# Patient Record
Sex: MALE | Race: WHITE | Employment: UNEMPLOYED | URBAN - METROPOLITAN AREA
[De-identification: names, ages, dates, MRNs, and addresses within clinical notes are randomized per-mention and may not be internally consistent; named-entity substitution may affect disease eponyms.]

---

## 2022-04-20 ENCOUNTER — TELEPHONE (OUTPATIENT)
Dept: FAMILY MEDICINE CLINIC | Facility: CLINIC | Age: 42
End: 2022-04-20

## 2022-06-30 ENCOUNTER — OFFICE VISIT (OUTPATIENT)
Dept: INTERNAL MEDICINE CLINIC | Facility: CLINIC | Age: 42
End: 2022-06-30
Payer: MEDICAID

## 2022-06-30 VITALS
DIASTOLIC BLOOD PRESSURE: 72 MMHG | WEIGHT: 170.38 LBS | TEMPERATURE: 98 F | HEART RATE: 76 BPM | BODY MASS INDEX: 24.95 KG/M2 | OXYGEN SATURATION: 97 % | HEIGHT: 69.29 IN | SYSTOLIC BLOOD PRESSURE: 116 MMHG

## 2022-06-30 DIAGNOSIS — Z00.00 WELLNESS EXAMINATION: Primary | ICD-10-CM

## 2022-06-30 DIAGNOSIS — G89.29 CHRONIC BILATERAL LOW BACK PAIN WITH BILATERAL SCIATICA: ICD-10-CM

## 2022-06-30 DIAGNOSIS — H91.93 BILATERAL HEARING LOSS, UNSPECIFIED HEARING LOSS TYPE: ICD-10-CM

## 2022-06-30 DIAGNOSIS — M54.41 CHRONIC BILATERAL LOW BACK PAIN WITH BILATERAL SCIATICA: ICD-10-CM

## 2022-06-30 DIAGNOSIS — M54.42 CHRONIC BILATERAL LOW BACK PAIN WITH BILATERAL SCIATICA: ICD-10-CM

## 2022-06-30 PROCEDURE — 3074F SYST BP LT 130 MM HG: CPT | Performed by: FAMILY MEDICINE

## 2022-06-30 PROCEDURE — 3078F DIAST BP <80 MM HG: CPT | Performed by: FAMILY MEDICINE

## 2022-06-30 PROCEDURE — 99202 OFFICE O/P NEW SF 15 MIN: CPT | Performed by: FAMILY MEDICINE

## 2022-06-30 PROCEDURE — 99386 PREV VISIT NEW AGE 40-64: CPT | Performed by: FAMILY MEDICINE

## 2022-06-30 PROCEDURE — 3008F BODY MASS INDEX DOCD: CPT | Performed by: FAMILY MEDICINE

## 2022-06-30 RX ORDER — NAPROXEN 500 MG/1
500 TABLET ORAL 2 TIMES DAILY PRN
Qty: 60 TABLET | Refills: 1 | Status: SHIPPED | OUTPATIENT
Start: 2022-06-30

## 2022-06-30 RX ORDER — CYCLOBENZAPRINE HCL 5 MG
5 TABLET ORAL NIGHTLY PRN
Qty: 14 TABLET | Refills: 0 | Status: SHIPPED | OUTPATIENT
Start: 2022-06-30

## 2022-07-01 ENCOUNTER — LAB ENCOUNTER (OUTPATIENT)
Dept: LAB | Facility: HOSPITAL | Age: 42
End: 2022-07-01
Attending: FAMILY MEDICINE
Payer: MEDICAID

## 2022-07-01 DIAGNOSIS — Z00.00 WELLNESS EXAMINATION: ICD-10-CM

## 2022-07-01 LAB
ALBUMIN SERPL-MCNC: 3.9 G/DL (ref 3.4–5)
ALBUMIN/GLOB SERPL: 1.2 {RATIO} (ref 1–2)
ALP LIVER SERPL-CCNC: 53 U/L
ALT SERPL-CCNC: 24 U/L
ANION GAP SERPL CALC-SCNC: 6 MMOL/L (ref 0–18)
AST SERPL-CCNC: 21 U/L (ref 15–37)
BASOPHILS # BLD AUTO: 0.04 X10(3) UL (ref 0–0.2)
BASOPHILS NFR BLD AUTO: 0.5 %
BILIRUB SERPL-MCNC: 0.7 MG/DL (ref 0.1–2)
BILIRUB UR QL: NEGATIVE
BUN BLD-MCNC: 11 MG/DL (ref 7–18)
BUN/CREAT SERPL: 9.6 (ref 10–20)
CALCIUM BLD-MCNC: 9 MG/DL (ref 8.5–10.1)
CHLORIDE SERPL-SCNC: 107 MMOL/L (ref 98–112)
CHOLEST SERPL-MCNC: 210 MG/DL (ref ?–200)
CLARITY UR: CLEAR
CO2 SERPL-SCNC: 28 MMOL/L (ref 21–32)
COLOR UR: YELLOW
CREAT BLD-MCNC: 1.14 MG/DL
DEPRECATED RDW RBC AUTO: 44.1 FL (ref 35.1–46.3)
EOSINOPHIL # BLD AUTO: 0.18 X10(3) UL (ref 0–0.7)
EOSINOPHIL NFR BLD AUTO: 2.1 %
ERYTHROCYTE [DISTWIDTH] IN BLOOD BY AUTOMATED COUNT: 13 % (ref 11–15)
EST. AVERAGE GLUCOSE BLD GHB EST-MCNC: 108 MG/DL (ref 68–126)
FASTING PATIENT LIPID ANSWER: YES
FASTING STATUS PATIENT QL REPORTED: YES
GLOBULIN PLAS-MCNC: 3.3 G/DL (ref 2.8–4.4)
GLUCOSE BLD-MCNC: 87 MG/DL (ref 70–99)
GLUCOSE UR-MCNC: NEGATIVE MG/DL
HBA1C MFR BLD: 5.4 % (ref ?–5.7)
HCT VFR BLD AUTO: 42.2 %
HDLC SERPL-MCNC: 54 MG/DL (ref 40–59)
HGB BLD-MCNC: 13.8 G/DL
HGB UR QL STRIP.AUTO: NEGATIVE
IMM GRANULOCYTES # BLD AUTO: 0.01 X10(3) UL (ref 0–1)
IMM GRANULOCYTES NFR BLD: 0.1 %
KETONES UR-MCNC: NEGATIVE MG/DL
LDLC SERPL CALC-MCNC: 138 MG/DL (ref ?–100)
LEUKOCYTE ESTERASE UR QL STRIP.AUTO: NEGATIVE
LYMPHOCYTES # BLD AUTO: 2.6 X10(3) UL (ref 1–4)
LYMPHOCYTES NFR BLD AUTO: 30.7 %
MCH RBC QN AUTO: 30.2 PG (ref 26–34)
MCHC RBC AUTO-ENTMCNC: 32.7 G/DL (ref 31–37)
MCV RBC AUTO: 92.3 FL
MONOCYTES # BLD AUTO: 0.56 X10(3) UL (ref 0.1–1)
MONOCYTES NFR BLD AUTO: 6.6 %
NEUTROPHILS # BLD AUTO: 5.07 X10 (3) UL (ref 1.5–7.7)
NEUTROPHILS # BLD AUTO: 5.07 X10(3) UL (ref 1.5–7.7)
NEUTROPHILS NFR BLD AUTO: 60 %
NITRITE UR QL STRIP.AUTO: NEGATIVE
NONHDLC SERPL-MCNC: 156 MG/DL (ref ?–130)
OSMOLALITY SERPL CALC.SUM OF ELEC: 291 MOSM/KG (ref 275–295)
PH UR: 5 [PH] (ref 5–8)
PLATELET # BLD AUTO: 222 10(3)UL (ref 150–450)
POTASSIUM SERPL-SCNC: 3.9 MMOL/L (ref 3.5–5.1)
PROT SERPL-MCNC: 7.2 G/DL (ref 6.4–8.2)
PROT UR-MCNC: NEGATIVE MG/DL
RBC # BLD AUTO: 4.57 X10(6)UL
SODIUM SERPL-SCNC: 141 MMOL/L (ref 136–145)
SP GR UR STRIP: 1.02 (ref 1–1.03)
TRIGL SERPL-MCNC: 103 MG/DL (ref 30–149)
UROBILINOGEN UR STRIP-ACNC: <2
VIT C UR-MCNC: NEGATIVE MG/DL
VLDLC SERPL CALC-MCNC: 19 MG/DL (ref 0–30)
WBC # BLD AUTO: 8.5 X10(3) UL (ref 4–11)

## 2022-07-01 PROCEDURE — 80053 COMPREHEN METABOLIC PANEL: CPT

## 2022-07-01 PROCEDURE — 85025 COMPLETE CBC W/AUTO DIFF WBC: CPT | Performed by: FAMILY MEDICINE

## 2022-07-01 PROCEDURE — 81003 URINALYSIS AUTO W/O SCOPE: CPT

## 2022-07-01 PROCEDURE — 80061 LIPID PANEL: CPT

## 2022-07-01 PROCEDURE — 36415 COLL VENOUS BLD VENIPUNCTURE: CPT

## 2022-07-01 PROCEDURE — 83036 HEMOGLOBIN GLYCOSYLATED A1C: CPT

## 2022-08-01 ENCOUNTER — TELEPHONE (OUTPATIENT)
Dept: INTERNAL MEDICINE CLINIC | Facility: CLINIC | Age: 42
End: 2022-08-01

## 2023-03-11 ENCOUNTER — HOSPITAL ENCOUNTER (OUTPATIENT)
Dept: GENERAL RADIOLOGY | Facility: HOSPITAL | Age: 43
Discharge: HOME | End: 2023-03-11
Attending: FAMILY MEDICINE
Payer: MEDICARE

## 2023-03-11 DIAGNOSIS — M54.41 CHRONIC BILATERAL LOW BACK PAIN WITH BILATERAL SCIATICA: ICD-10-CM

## 2023-03-11 DIAGNOSIS — G89.29 CHRONIC BILATERAL LOW BACK PAIN WITH BILATERAL SCIATICA: ICD-10-CM

## 2023-03-11 DIAGNOSIS — M54.42 CHRONIC BILATERAL LOW BACK PAIN WITH BILATERAL SCIATICA: ICD-10-CM

## 2023-03-11 PROCEDURE — 72100 X-RAY EXAM L-S SPINE 2/3 VWS: CPT | Performed by: FAMILY MEDICINE

## 2023-03-11 PROCEDURE — 72040 X-RAY EXAM NECK SPINE 2-3 VW: CPT | Performed by: FAMILY MEDICINE

## 2023-04-03 ENCOUNTER — OFFICE VISIT (OUTPATIENT)
Dept: INTERNAL MEDICINE CLINIC | Facility: CLINIC | Age: 43
End: 2023-04-03
Payer: MEDICARE

## 2023-04-03 ENCOUNTER — HOSPITAL ENCOUNTER (OUTPATIENT)
Dept: GENERAL RADIOLOGY | Facility: HOSPITAL | Age: 43
Discharge: HOME | End: 2023-04-03
Attending: FAMILY MEDICINE
Payer: MEDICARE

## 2023-04-03 VITALS
DIASTOLIC BLOOD PRESSURE: 80 MMHG | HEIGHT: 69.29 IN | SYSTOLIC BLOOD PRESSURE: 110 MMHG | WEIGHT: 178 LBS | OXYGEN SATURATION: 96 % | TEMPERATURE: 98 F | HEART RATE: 96 BPM | BODY MASS INDEX: 26.07 KG/M2

## 2023-04-03 DIAGNOSIS — G89.29 CHRONIC LEFT SHOULDER PAIN: Primary | ICD-10-CM

## 2023-04-03 DIAGNOSIS — M25.512 CHRONIC LEFT SHOULDER PAIN: ICD-10-CM

## 2023-04-03 DIAGNOSIS — M25.512 CHRONIC LEFT SHOULDER PAIN: Primary | ICD-10-CM

## 2023-04-03 DIAGNOSIS — M54.12 CERVICAL RADICULOPATHY: ICD-10-CM

## 2023-04-03 DIAGNOSIS — G89.29 CHRONIC LEFT SHOULDER PAIN: ICD-10-CM

## 2023-04-03 PROCEDURE — 99214 OFFICE O/P EST MOD 30 MIN: CPT | Performed by: FAMILY MEDICINE

## 2023-04-03 PROCEDURE — 73030 X-RAY EXAM OF SHOULDER: CPT | Performed by: FAMILY MEDICINE

## 2023-04-03 RX ORDER — IBUPROFEN 200 MG
200 TABLET ORAL EVERY 6 HOURS PRN
COMMUNITY

## 2023-04-03 RX ORDER — GABAPENTIN 100 MG/1
100 CAPSULE ORAL 2 TIMES DAILY PRN
Qty: 30 CAPSULE | Refills: 0 | Status: SHIPPED | OUTPATIENT
Start: 2023-04-03

## 2023-04-03 NOTE — PATIENT INSTRUCTIONS
If no improvement with physical therapy, contact our office so that further imaging could be ordered  If no improvement with gabapentin, contact our office for dose to be adjusted.

## 2023-07-25 ENCOUNTER — OFFICE VISIT (OUTPATIENT)
Dept: INTERNAL MEDICINE CLINIC | Facility: CLINIC | Age: 43
End: 2023-07-25
Payer: MEDICARE

## 2023-07-25 VITALS
HEIGHT: 69.29 IN | TEMPERATURE: 98 F | BODY MASS INDEX: 26.8 KG/M2 | OXYGEN SATURATION: 96 % | DIASTOLIC BLOOD PRESSURE: 70 MMHG | WEIGHT: 183 LBS | SYSTOLIC BLOOD PRESSURE: 118 MMHG | HEART RATE: 71 BPM

## 2023-07-25 DIAGNOSIS — M25.512 CHRONIC LEFT SHOULDER PAIN: ICD-10-CM

## 2023-07-25 DIAGNOSIS — Z00.00 WELLNESS EXAMINATION: Primary | ICD-10-CM

## 2023-07-25 DIAGNOSIS — G89.29 CHRONIC LEFT SHOULDER PAIN: ICD-10-CM

## 2023-07-25 DIAGNOSIS — L73.9 FOLLICULITIS: ICD-10-CM

## 2023-07-25 DIAGNOSIS — M54.12 CERVICAL RADICULOPATHY: ICD-10-CM

## 2023-07-25 DIAGNOSIS — L30.9 DERMATITIS: ICD-10-CM

## 2023-07-25 PROCEDURE — 99213 OFFICE O/P EST LOW 20 MIN: CPT | Performed by: FAMILY MEDICINE

## 2023-07-25 PROCEDURE — 99396 PREV VISIT EST AGE 40-64: CPT | Performed by: FAMILY MEDICINE

## 2023-07-25 RX ORDER — MUPIROCIN CALCIUM 20 MG/G
1 CREAM TOPICAL 2 TIMES DAILY PRN
Qty: 15 G | Refills: 0 | Status: SHIPPED | OUTPATIENT
Start: 2023-07-25 | End: 2023-08-08

## 2023-07-25 RX ORDER — TRIAMCINOLONE ACETONIDE 1 MG/G
CREAM TOPICAL 2 TIMES DAILY PRN
Qty: 15 G | Refills: 1 | Status: SHIPPED | OUTPATIENT
Start: 2023-07-25

## 2023-07-25 RX ORDER — GABAPENTIN 300 MG/1
300 CAPSULE ORAL 2 TIMES DAILY PRN
Qty: 60 CAPSULE | Refills: 2 | Status: SHIPPED | OUTPATIENT
Start: 2023-07-25

## 2023-07-29 ENCOUNTER — LAB ENCOUNTER (OUTPATIENT)
Dept: LAB | Facility: HOSPITAL | Age: 43
End: 2023-07-29
Attending: FAMILY MEDICINE
Payer: MEDICARE

## 2023-07-29 DIAGNOSIS — Z00.00 WELLNESS EXAMINATION: ICD-10-CM

## 2023-07-29 LAB
ALBUMIN SERPL-MCNC: 3.6 G/DL (ref 3.4–5)
ALBUMIN/GLOB SERPL: 1 {RATIO} (ref 1–2)
ALP LIVER SERPL-CCNC: 65 U/L
ALT SERPL-CCNC: 17 U/L
ANION GAP SERPL CALC-SCNC: 4 MMOL/L (ref 0–18)
AST SERPL-CCNC: 16 U/L (ref 15–37)
BASOPHILS # BLD AUTO: 0.02 X10(3) UL (ref 0–0.2)
BASOPHILS NFR BLD AUTO: 0.2 %
BILIRUB SERPL-MCNC: 0.4 MG/DL (ref 0.1–2)
BUN BLD-MCNC: 12 MG/DL (ref 7–18)
BUN/CREAT SERPL: 10.5 (ref 10–20)
CALCIUM BLD-MCNC: 8.9 MG/DL (ref 8.5–10.1)
CHLORIDE SERPL-SCNC: 107 MMOL/L (ref 98–112)
CHOLEST SERPL-MCNC: 235 MG/DL (ref ?–200)
CO2 SERPL-SCNC: 29 MMOL/L (ref 21–32)
CREAT BLD-MCNC: 1.14 MG/DL
DEPRECATED RDW RBC AUTO: 42.1 FL (ref 35.1–46.3)
EGFRCR SERPLBLD CKD-EPI 2021: 81 ML/MIN/1.73M2 (ref 60–?)
EOSINOPHIL # BLD AUTO: 0.15 X10(3) UL (ref 0–0.7)
EOSINOPHIL NFR BLD AUTO: 1.6 %
ERYTHROCYTE [DISTWIDTH] IN BLOOD BY AUTOMATED COUNT: 12.9 % (ref 11–15)
EST. AVERAGE GLUCOSE BLD GHB EST-MCNC: 103 MG/DL (ref 68–126)
FASTING PATIENT LIPID ANSWER: YES
FASTING STATUS PATIENT QL REPORTED: YES
GLOBULIN PLAS-MCNC: 3.7 G/DL (ref 2.8–4.4)
GLUCOSE BLD-MCNC: 94 MG/DL (ref 70–99)
HBA1C MFR BLD: 5.2 % (ref ?–5.7)
HCT VFR BLD AUTO: 44.9 %
HDLC SERPL-MCNC: 46 MG/DL (ref 40–59)
HGB BLD-MCNC: 14.8 G/DL
IMM GRANULOCYTES # BLD AUTO: 0.03 X10(3) UL (ref 0–1)
IMM GRANULOCYTES NFR BLD: 0.3 %
LDLC SERPL CALC-MCNC: 168 MG/DL (ref ?–100)
LYMPHOCYTES # BLD AUTO: 2.31 X10(3) UL (ref 1–4)
LYMPHOCYTES NFR BLD AUTO: 25.3 %
MCH RBC QN AUTO: 29.5 PG (ref 26–34)
MCHC RBC AUTO-ENTMCNC: 33 G/DL (ref 31–37)
MCV RBC AUTO: 89.4 FL
MONOCYTES # BLD AUTO: 0.6 X10(3) UL (ref 0.1–1)
MONOCYTES NFR BLD AUTO: 6.6 %
NEUTROPHILS # BLD AUTO: 6.02 X10 (3) UL (ref 1.5–7.7)
NEUTROPHILS # BLD AUTO: 6.02 X10(3) UL (ref 1.5–7.7)
NEUTROPHILS NFR BLD AUTO: 66 %
NONHDLC SERPL-MCNC: 189 MG/DL (ref ?–130)
OSMOLALITY SERPL CALC.SUM OF ELEC: 290 MOSM/KG (ref 275–295)
PLATELET # BLD AUTO: 257 10(3)UL (ref 150–450)
POTASSIUM SERPL-SCNC: 3.7 MMOL/L (ref 3.5–5.1)
PROT SERPL-MCNC: 7.3 G/DL (ref 6.4–8.2)
RBC # BLD AUTO: 5.02 X10(6)UL
SODIUM SERPL-SCNC: 140 MMOL/L (ref 136–145)
TRIGL SERPL-MCNC: 114 MG/DL (ref 30–149)
TSI SER-ACNC: 0.77 MIU/ML (ref 0.36–3.74)
VLDLC SERPL CALC-MCNC: 23 MG/DL (ref 0–30)
WBC # BLD AUTO: 9.1 X10(3) UL (ref 4–11)

## 2023-07-29 PROCEDURE — 36415 COLL VENOUS BLD VENIPUNCTURE: CPT

## 2023-07-29 PROCEDURE — 85025 COMPLETE CBC W/AUTO DIFF WBC: CPT | Performed by: FAMILY MEDICINE

## 2023-07-29 PROCEDURE — 80061 LIPID PANEL: CPT

## 2023-07-29 PROCEDURE — 80053 COMPREHEN METABOLIC PANEL: CPT

## 2023-07-29 PROCEDURE — 84443 ASSAY THYROID STIM HORMONE: CPT

## 2023-07-29 PROCEDURE — 83036 HEMOGLOBIN GLYCOSYLATED A1C: CPT

## 2023-08-06 ENCOUNTER — HOSPITAL ENCOUNTER (OUTPATIENT)
Dept: CT IMAGING | Facility: HOSPITAL | Age: 43
End: 2023-08-06
Attending: FAMILY MEDICINE
Payer: MEDICARE

## 2023-08-06 ENCOUNTER — HOSPITAL ENCOUNTER (OUTPATIENT)
Dept: CT IMAGING | Facility: HOSPITAL | Age: 43
Discharge: HOME | End: 2023-08-06
Attending: FAMILY MEDICINE
Payer: MEDICARE

## 2023-08-06 DIAGNOSIS — M54.12 CERVICAL RADICULOPATHY: ICD-10-CM

## 2023-08-06 PROCEDURE — 72125 CT NECK SPINE W/O DYE: CPT | Performed by: FAMILY MEDICINE

## 2023-08-09 ENCOUNTER — PATIENT MESSAGE (OUTPATIENT)
Dept: INTERNAL MEDICINE CLINIC | Facility: CLINIC | Age: 43
End: 2023-08-09

## 2023-08-10 NOTE — TELEPHONE ENCOUNTER
From: Ethan Luu  Sent: 8/10/2023 10:23 AM CDT  To: [facility] Clinical Staff  Subject: Question regarding LIPID PANEL    Cant see it too small and cant zoom it

## 2023-08-13 ENCOUNTER — PATIENT MESSAGE (OUTPATIENT)
Dept: INTERNAL MEDICINE CLINIC | Facility: CLINIC | Age: 43
End: 2023-08-13

## 2023-09-01 ENCOUNTER — HOSPITAL ENCOUNTER (OUTPATIENT)
Dept: MRI IMAGING | Facility: HOSPITAL | Age: 43
Discharge: HOME | End: 2023-09-01
Attending: FAMILY MEDICINE
Payer: MEDICARE

## 2023-09-01 ENCOUNTER — PATIENT MESSAGE (OUTPATIENT)
Dept: INTERNAL MEDICINE CLINIC | Facility: CLINIC | Age: 43
End: 2023-09-01

## 2023-09-01 DIAGNOSIS — M25.512 CHRONIC LEFT SHOULDER PAIN: ICD-10-CM

## 2023-09-01 DIAGNOSIS — G89.29 CHRONIC LEFT SHOULDER PAIN: ICD-10-CM

## 2023-09-01 PROCEDURE — 73221 MRI JOINT UPR EXTREM W/O DYE: CPT | Performed by: FAMILY MEDICINE

## 2023-09-06 ENCOUNTER — PATIENT MESSAGE (OUTPATIENT)
Dept: INTERNAL MEDICINE CLINIC | Facility: CLINIC | Age: 43
End: 2023-09-06

## 2023-09-07 NOTE — TELEPHONE ENCOUNTER
From: Rosas Maya  To: New Connor MD  Sent: 9/6/2023 12:48 PM CDT  Subject: Question regarding MRI SHOULDER, LEFT (WITHOUT CONTRAST) (CPT=73221)    Okay I have to call them to make appt ? ?

## 2023-10-31 ENCOUNTER — OFFICE VISIT (OUTPATIENT)
Dept: ORTHOPEDICS CLINIC | Facility: CLINIC | Age: 43
End: 2023-10-31

## 2023-10-31 VITALS — HEART RATE: 78 BPM | SYSTOLIC BLOOD PRESSURE: 115 MMHG | DIASTOLIC BLOOD PRESSURE: 77 MMHG

## 2023-10-31 DIAGNOSIS — M54.12 LEFT CERVICAL RADICULOPATHY: ICD-10-CM

## 2023-10-31 DIAGNOSIS — M75.22 BICEPS TENDINITIS OF LEFT SHOULDER: ICD-10-CM

## 2023-10-31 DIAGNOSIS — M50.30 DEGENERATIVE DISC DISEASE, CERVICAL: ICD-10-CM

## 2023-10-31 DIAGNOSIS — M75.82 ROTATOR CUFF TENDINITIS, LEFT: Primary | ICD-10-CM

## 2023-10-31 PROCEDURE — 20610 DRAIN/INJ JOINT/BURSA W/O US: CPT | Performed by: ORTHOPAEDIC SURGERY

## 2023-10-31 PROCEDURE — 99204 OFFICE O/P NEW MOD 45 MIN: CPT | Performed by: ORTHOPAEDIC SURGERY

## 2023-10-31 RX ORDER — TRIAMCINOLONE ACETONIDE 40 MG/ML
40 INJECTION, SUSPENSION INTRA-ARTICULAR; INTRAMUSCULAR ONCE
Status: COMPLETED | OUTPATIENT
Start: 2023-10-31 | End: 2023-10-31

## 2023-10-31 RX ADMIN — TRIAMCINOLONE ACETONIDE 40 MG: 40 INJECTION, SUSPENSION INTRA-ARTICULAR; INTRAMUSCULAR at 11:47:00

## 2023-10-31 NOTE — PROGRESS NOTES
Per verbal order from Dr. Iqra River, draw up 5ml of 0.5% Marcaine and 1ml of Kenalog 40 for cortisone injection to left shoulder. Carmine Mitchell MA  Patient provided education handout for cortisone injection.

## 2023-10-31 NOTE — H&P
NURSING INTAKE COMMENTS: Patient presents with:  Shoulder Pain: Consult- use  Jesusita WHITE#[id number]- L shoulder -onset- a yr ago- woking as  he think it's fr repetitive movement- rates pain 9-10/10 most of the time- has xray and MRI in the system  Neck Pain: Consult- also a yr ago- stated pain radiates down to his arm- has tingling in the hand- has CT and xray in the system      HPI: This 39year old right-hand-dominant male presents today with his wife. Language line provided sign language interpretation. For about a year he has had cervical complaints radiating down to the left C6 dermatome with occasional tingling. His left shoulder has intrinsic pains as well and the seem to be his more concerning problem. He denies bowel or bladder problems or balance disorder. He says sometimes he feels he has little weakness of left . He continues to work as a  sometimes for 4-hour shifts and sometimes for 8-hour shifts. He lives independently his wife and small child. He is in good shape physically. He denies diabetes. Past Medical History:   Diagnosis Date    Deaf      Past Surgical History:   Procedure Laterality Date    HERNIA SURGERY       Current Outpatient Medications   Medication Sig Dispense Refill    gabapentin 300 MG Oral Cap Take 1 capsule (300 mg total) by mouth 2 (two) times daily as needed (nerve pain). 60 capsule 2    triamcinolone 0.1 % External Cream Apply topically 2 (two) times daily as needed. For foot rash 15 g 1    ibuprofen 200 MG Oral Tab Take 1 tablet (200 mg total) by mouth every 6 (six) hours as needed.  (Patient not taking: Reported on 7/25/2023)       No Known Allergies  Family History   Problem Relation Age of Onset    Colon Cancer Father 80    Other (osteoarthritis) Mother     Other (digestive issues) Mother         unclear- had surgery     No family Hx of DVT/PE    Social History    Occupational History      Not on file    Tobacco Use      Smoking status: Never      Smokeless tobacco: Never    Substance and Sexual Activity      Alcohol use: Not on file      Drug use: Not on file      Sexual activity: Not on file       Review of Systems:  GENERAL: feels generally well, no significant weight loss or weight gain  SKIN: no ulcerated or worrisome skin lesions  EYES:denies blurred vision or double vision  HEENT: denies new nasal congestion, sinus pain or ST  LUNGS: denies shortness of breath  CARDIOVASCULAR: denies chest pain  GI: no hematemesis, no worsening heartburn, no diarrhea  : no dysuria, no blood in urine, no difficulty urinating, no incontinence  MUSCULOSKELETAL: no other musculoskeletal complaints other than in HPI  NEURO: no numbness or tingling, no weakness or balance disorder  PSYCHE: no depression or anxiety  HEMATOLOGIC: no hx of blood dyscrasia, no Hx DVT/PE  ENDOCRINE: no thyroid or diabetes issues  ALL/ASTHMA: no new hx of severe allergy or asthma    Physical Examination:    There were no vitals taken for this visit. Constitutional: appears well hydrated, alert and responsive, no acute distress noted  Extremities: The contours of the left shoulder were symmetrically normal without deltoid atrophy, scapular winging, or biceps defect. No masses on his neck. Musculoskeletal: Full cervical range of motion with negative Spurling test.  Full shoulder motion bilaterally in all directions. No acromioclavicular tenderness. Normal rotator cuff strength for all 4 tendons. Positive biceps and impingement sign left shoulder. Neurological: Symmetrically diminished reflexes upper and lower extremities. Able to toe walk and heel walk easily. Gait normal.  Normal motor and sensory to the left upper extremity currently. Tinel's sign to the left carpal tunnel was negative. Imaging: X-rays and CT of the cervical spine show severe degenerative disc disease at C5-6 and C6-7.     MRI of the left shoulder shows minimal partial-thickness tears.  These are well less than 50%. It is more of a tendinitis and tendinopathy for the biceps and rotator cuff. The radiologist read a small nondisplaced SLAP tear. His symptoms correlate with the biceps and rotator cuff. No results found. Lab Results   Component Value Date    WBC 9.1 07/29/2023    HGB 14.8 07/29/2023    .0 07/29/2023      Lab Results   Component Value Date    GLU 94 07/29/2023    BUN 12 07/29/2023    CREATSERUM 1.14 07/29/2023    GFRNAA 79 07/01/2022    GFRAA 91 07/01/2022        Assessment and Plan:  Diagnoses and all orders for this visit:    Rotator cuff tendinitis, left  -     PHYSICAL THERAPY - INTERNAL  -     Arthrocentesis aspiration and injection major Left joint bursa w/o US  -     triamcinolone acetonide (Kenalog-40) 40 MG/ML injection 40 mg    Biceps tendinitis of left shoulder  -     PHYSICAL THERAPY - INTERNAL  -     Arthrocentesis aspiration and injection major Left joint bursa w/o US  -     triamcinolone acetonide (Kenalog-40) 40 MG/ML injection 40 mg    Degenerative disc disease, cervical  -     PHYSICAL THERAPY - INTERNAL  -     MRI SPINE CERVICAL (CPT=72141); Future    Left cervical radiculopathy  -     PHYSICAL THERAPY - INTERNAL  -     MRI SPINE CERVICAL (CPT=72141); Future        Assessment: Left shoulder biceps and rotator cuff tendinitis, degenerative disc disease C5-6 and C6-7 with left C6 radiculopathy intermittently. Neurologically intact. Symptoms for 1 year. No trauma and no work comp issues. Plan: I recommended cortisone injection of the left shoulder, physical therapy for both the neck and left shoulder, and MRI of the cervical spine. He agreed to all the above. He will do the home exercises. Aspiration of the left shoulder was negative and he tolerated the injection bupivacaine 0.5% 5 cc and Kenalog 1 cc without issue mostly in the glenohumeral joint and some to the subacromial space. He will do the home exercise a learned in therapy. I will see him after the MRI of the cervical spine for further recommendations. Pain center may be a recommendation at that time. Follow Up: No follow-ups on file.     Andreia Manley MD

## 2023-11-12 ENCOUNTER — HOSPITAL ENCOUNTER (OUTPATIENT)
Dept: MRI IMAGING | Facility: HOSPITAL | Age: 43
End: 2023-11-12
Attending: ORTHOPAEDIC SURGERY
Payer: MEDICARE

## 2023-11-12 ENCOUNTER — HOSPITAL ENCOUNTER (OUTPATIENT)
Dept: MRI IMAGING | Facility: HOSPITAL | Age: 43
Discharge: HOME | End: 2023-11-12
Attending: ORTHOPAEDIC SURGERY
Payer: MEDICARE

## 2023-11-12 DIAGNOSIS — M54.12 LEFT CERVICAL RADICULOPATHY: ICD-10-CM

## 2023-11-12 DIAGNOSIS — M50.30 DEGENERATIVE DISC DISEASE, CERVICAL: ICD-10-CM

## 2023-11-12 PROCEDURE — 72141 MRI NECK SPINE W/O DYE: CPT | Performed by: ORTHOPAEDIC SURGERY

## 2023-11-21 ENCOUNTER — OFFICE VISIT (OUTPATIENT)
Dept: ORTHOPEDICS CLINIC | Facility: CLINIC | Age: 43
End: 2023-11-21
Payer: MEDICARE

## 2023-11-21 DIAGNOSIS — G95.0 SYRINX OF SPINAL CORD (HCC): ICD-10-CM

## 2023-11-21 DIAGNOSIS — M50.30 DEGENERATIVE DISC DISEASE, CERVICAL: ICD-10-CM

## 2023-11-21 DIAGNOSIS — M54.12 LEFT CERVICAL RADICULOPATHY: ICD-10-CM

## 2023-11-21 DIAGNOSIS — M75.82 ROTATOR CUFF TENDINITIS, LEFT: Primary | ICD-10-CM

## 2023-11-21 DIAGNOSIS — M75.22 BICEPS TENDINITIS OF LEFT SHOULDER: ICD-10-CM

## 2023-11-21 PROCEDURE — 99214 OFFICE O/P EST MOD 30 MIN: CPT | Performed by: ORTHOPAEDIC SURGERY

## 2023-11-21 NOTE — PROGRESS NOTES
NURSING INTAKE COMMENTS:   Chief Complaint   Patient presents with    Follow - Up     Use american sign language Deana Fu ZF#[id number]- L shoulder and neck pain f/u- had inj on L shoulder w/ some improvement- rates pain 6-7/10 on and off- aslo here for cervical spine MRI result       HPI: This 39year old male presents today with sign language language line for interpretation. He was here for follow-up for the left shoulder and cervical spine but also for new problem of thoracic syrinx. Since her last visit we injected the left shoulder, he said the left shoulder pain was improved but not resolved and he really could not give a percentage. For the neck to the left arm he points to the left C6 dermatome similar to last visit. He says occasionally he might feel left hand weakness but today he had normal strength and denies bowel or bladder problems or balance disorder. He denies thoracic radiculopathy or dysesthesias. He denies bowel or bladder problems or balance disorder. His first physical therapy session is scheduled for 11/27/2023. Past Medical History:   Diagnosis Date    Deaf      Past Surgical History:   Procedure Laterality Date    HERNIA SURGERY       Current Outpatient Medications   Medication Sig Dispense Refill    gabapentin 300 MG Oral Cap Take 1 capsule (300 mg total) by mouth 2 (two) times daily as needed (nerve pain). 60 capsule 2    triamcinolone 0.1 % External Cream Apply topically 2 (two) times daily as needed. For foot rash 15 g 1    ibuprofen 200 MG Oral Tab Take 1 tablet (200 mg total) by mouth every 6 (six) hours as needed.  (Patient not taking: Reported on 7/25/2023)       No Known Allergies  Family History   Problem Relation Age of Onset    Colon Cancer Father 80    Other (osteoarthritis) Mother     Other (digestive issues) Mother         unclear- had surgery     No family Hx of DVT/PE    Social History     Occupational History    Not on file   Tobacco Use    Smoking status: Never Smokeless tobacco: Never   Substance and Sexual Activity    Alcohol use: Not on file    Drug use: Not on file    Sexual activity: Not on file        Review of Systems:  GENERAL: feels generally well, no significant weight loss or weight gain  SKIN: no ulcerated or worrisome skin lesions  EYES:denies blurred vision or double vision  HEENT: denies new nasal congestion, sinus pain or ST  LUNGS: denies shortness of breath  CARDIOVASCULAR: denies chest pain  GI: no hematemesis, no worsening heartburn, no diarrhea  : no dysuria, no blood in urine, no difficulty urinating, no incontinence  MUSCULOSKELETAL: no other musculoskeletal complaints other than in HPI  NEURO: no numbness or tingling, no weakness or balance disorder  PSYCHE: no depression or anxiety  HEMATOLOGIC: no hx of blood dyscrasia, no Hx DVT/PE  ENDOCRINE: no thyroid or diabetes issues  ALL/ASTHMA: no new hx of severe allergy or asthma    Physical Examination:    There were no vitals taken for this visit. Constitutional: appears well hydrated, alert and responsive, no acute distress noted  Extremities: Neck had near full range of motion similar last visit. Spurling test negative. 5 out of 5 strength in both upper extremities with negative Moore test.  Symmetrically diminished reflexes. No clonus and normal gait and no thoracic radiculopathy complaints. Musculoskeletal: Full motion left shoulder with tenderness mostly in the bicep. No acromioclavicular tenderness and negative crossover. Normal rotator cuff strength. Neurological: As above. No numbness or tingling. Imaging: MRI cervical spine showed the worst foraminal stenosis at left C3-4 however symptoms are left C6. C5-6 does have foraminal stenosis bilaterally including the left side. This is probably a symptomatic level. Syrinx is seen at the bottom of the films in the upper thoracic spine.       MRI SPINE CERVICAL (CPT=72141)    Result Date: 11/13/2023  PROCEDURE: MRI SPINE CERVICAL (IQC=37978)  COMPARISON: [facility], MRI SHOULDER, LEFT (CPT=73221), 9/01/2023, 3:14 PM.  [facility], CT SPINE CERVICAL (FOK=70299), 8/06/2023, 7:30 AM.  [facility] for [facility], XR CERVICAL SPINE (2-3 VIEWS) (CPT=72040), 3/11/2023, 8:12 AM.  INDICATIONS: M54.12 Left cervical radiculopathy M50.30 Degenerative disc disease, cervical  TECHNIQUE: A variety of imaging planes and parameters were utilized for visualization of suspected pathology. Counting reference:  Craniocervical junction. FINDINGS:  CRANIOCERVICAL AREA: Normal foramen magnum with no Chiari malformation. PARASPINAL AREA: Normal with no visible mass. BONES: There are hypertrophic changes of the uncovertebral and facet joints bilaterally. Small anterior endplate osteophytes seen within the cervical vertebral bodies. There is no acute fracture, dislocation or marrow replacing lesion. Marrow edema seen  within the C6-C7 vertebral bodies and pedicles. CORD: 2 cm T2 and STIR hyperintense lesion seen within the thoracic cord at T2. OTHER: Grade 1 anterolisthesis of C3 on C4 and C4 on C5. Mild retrolisthesis of C5 on C6. Marcia Ceron CERVICAL DISC LEVELS: C2-C3: Mild hypertrophic changes of the facet joints and uncinate joints without canal or foraminal narrowing. C3-C4: Disk osteophyte complex which is asymmetric to the left along with facet and uncinate joint hypertrophy result in moderate narrowing of the canal and severe narrowing of the left neural foramen. There is mild narrowing of the right neural foramen. There is near complete effacement of the left ventral thecal space with left ventral cord abutment and flattening. There is mild narrowing of the right dorsal thecal space. There is bony encroachment upon the exiting left C4 nerve root. C4-C5: Disk osteophyte complex, facet, and uncinate joint hypertrophy result in mild narrowing of the canal and bilateral neural foramen.  C5-C6: Disk osteophyte complex, facet, and uncinate joint hypertrophy result in moderate narrowing of the canal and bilateral neural foramen. There is near complete effacement of the ventral thecal space with ventral cord abutment. The dorsal thecal space remains patent. C6-C7: Disk osteophyte complex, facet, and uncinate joint hypertrophy result in mild narrowing of the canal and bilateral neural foramen. C7-T1: Mild hypertrophic changes of the facet joints and uncinate joints without canal or foraminal narrowing. Perineural cysts seen within the bilateral neural foramen. CONCLUSION:   2 cm STIR hyperintense lesion within the thoracic cord at T2 suggestive of a syrinx. A dedicated MRI of the thoracic spine with IV contrast if patient can tolerate IV contrast is recommended for further evaluation. Degenerative disc, facet, uncovertebral joint disease throughout the cervical spine, most prominent at C3-C4 where there is severe narrowing of the left neural foramen. There is bony encroachment upon the exiting left C3 nerve root. No high-grade canal narrowing. Multilevel degenerative spondylolisthesis as described. Marrow edema within the C6-C7 vertebral bodies and pedicles suggestive of stress changes.      Dictated by (CST): Higinio Perez MD on 11/13/2023 at 10:47 AM     Finalized by (CST): Higinio Perez MD on 11/13/2023 at 11:02 AM             Lab Results   Component Value Date    WBC 9.1 07/29/2023    HGB 14.8 07/29/2023    .0 07/29/2023      Lab Results   Component Value Date    GLU 94 07/29/2023    BUN 12 07/29/2023    CREATSERUM 1.14 07/29/2023    GFRNAA 79 07/01/2022    GFRAA 91 07/01/2022        Assessment and Plan:  Diagnoses and all orders for this visit:    Rotator cuff tendinitis, left    Biceps tendinitis of left shoulder    Degenerative disc disease, cervical    Left cervical radiculopathy  -     PHYSICAL THERAPY - INTERNAL  -     Pain Management Referral - [facility] Location    Syrinx of spinal cord ([facility] [address]  -     MRI SPINE THORACIC (W+WO) (CPT=72157); Future  -     Neurosurgery Referral - In Network        Assessment: Above diagnoses. Plan: For the cervical spine recommended pain center consult and physical therapy. I do not think any surgery at the present time. For the left shoulder he needs to start his physical therapy. He will do the home exercise program.  If he is not improving in 6 to 8 weeks, he may consider making an appointment to discuss arthroscopy of the left shoulder for repairs versus debridements. For the syrinx, he does not seem to have symptoms but due to the size we will get a thoracic MRI with and without contrast and a neurosurgery referral.  I told him it is likely that it will be observed but we should get an opinion about this. For now I will see him as needed unless his problems worsen and he wishes to discuss surgery for the shoulder. Follow Up: No follow-ups on file.     Lalito Swartz MD

## 2023-11-22 ENCOUNTER — TELEPHONE (OUTPATIENT)
Dept: PAIN CLINIC | Facility: HOSPITAL | Age: 43
End: 2023-11-22

## 2023-11-27 ENCOUNTER — OFFICE VISIT (OUTPATIENT)
Dept: PHYSICAL THERAPY | Facility: HOSPITAL | Age: 43
End: 2023-11-27
Attending: ORTHOPAEDIC SURGERY
Payer: MEDICARE

## 2023-11-27 DIAGNOSIS — M54.12 LEFT CERVICAL RADICULOPATHY: ICD-10-CM

## 2023-11-27 DIAGNOSIS — M50.30 DEGENERATIVE DISC DISEASE, CERVICAL: ICD-10-CM

## 2023-11-27 DIAGNOSIS — M75.22 BICEPS TENDINITIS OF LEFT SHOULDER: ICD-10-CM

## 2023-11-27 DIAGNOSIS — M75.82 ROTATOR CUFF TENDINITIS, LEFT: Primary | ICD-10-CM

## 2023-11-27 PROCEDURE — 97110 THERAPEUTIC EXERCISES: CPT

## 2023-11-27 PROCEDURE — 97162 PT EVAL MOD COMPLEX 30 MIN: CPT

## 2023-11-29 ENCOUNTER — OFFICE VISIT (OUTPATIENT)
Dept: PHYSICAL THERAPY | Facility: HOSPITAL | Age: 43
End: 2023-11-29
Attending: ORTHOPAEDIC SURGERY
Payer: MEDICARE

## 2023-11-29 PROCEDURE — 97140 MANUAL THERAPY 1/> REGIONS: CPT

## 2023-11-29 PROCEDURE — 97110 THERAPEUTIC EXERCISES: CPT

## 2023-11-29 NOTE — PROGRESS NOTES
Referring Physician: Bhupendra Dumont MD     Date of Onset: 1  Date of eval: 11/27/23   Diagnosis: Rotator cuff tendinitis, left (M75.82)  Biceps tendinitis of left shoulder (M75.22)  Degenerative disc disease, cervical (M50.30)  Left cervical radiculopathy (M54.12)  Precautions: presence of Syrinx on thoracic MRI, decreased DTR's, at bilateral UE reflex testing. [facility] [phone] language line american sign language  Subjective: patient reports it has been hard to not irritate shoulder and neck due to continued part time janitorial work 20hrs/wk which involved some lifting and repetitive movements. Patient also reports pain attempting to lift 2 yo dtr. Pain: 8/10 neck   5/10 left shoulder area      Objective: Work setting requirements Mon-Fri: working 4 hours 5 days   20 hr/wk,   cleaning tables, wiping tables, empty trash bins.   Max wt possibly           lifting dtr, 2 yo   Initial eval: Observation/Posture: bilateral winging of scapula, mild fwd head  posture and mild depressed sternum, left anterior humeral head glide and medial rotation        initial initial   Cervical AROM: deg Pain (+/-)   Flexion 40        5/10   Extension 42    3/10   R Sidebend 25 8/10   L Sidebend 30 6/10   R Rotation 52 4/10   L Rotation 55 4/10   Protrusion WNL 5/10   Retraction Slight dec 5/10            Shoulder AROM: deg      initial initial 11/29/23     R    L L   Flex 140 130  8/10 130 5/10   Abd 165 150  8/10    ER WNL 60  8/10    IR WNL WNL          Strength UE:   -/5 MMT    initial initial     R  L   Shoulder flex      5 4 pn   Shoulder ext 5 5   Abduction (C5) 5 4pn   ER 5 5-   IR 5 5-   Biceps (C6) 5 5-   Wrist ext (C6) 5 5   Triceps (C7) 5 5-pn   Wrist flex (C7) 5 5   Digit ext (C7) 5 5   Digit flex (C8) 5 5   EPL (C8)  5 5   Interossei (T1) NT NT                  Flexibility: WNL, min, mod, severe limitation   initial initial     R L   Upper trap minimally restricted moderately restricted   Levator scap minimally restricted moderately restricted   Scalenes minimally restricted moderately restricted   Pec Major moderately restricted moderately restricted   Pec Minor moderately restricted moderately restricted   Lats moderately restricted severely restricted      Palpation: tenderness at upper traps left and left medial aspect scapula, anterior deltoid left and left biceps tendons  Upper Limb Tension Tests: TBD  Neuro Screen: sensation intact currently BUE  Decreased bilateral biceps, triceps and brachioradialis DTR's  Accessory Motion: decreased posterior glide left humeral head  Special Tests: ziegler rubia left +   speeds + left, Spurling's reported to reproduce pain. Cervical distraction decreased symptoms       Patient was instructed in and issued HEP for: see below for cervical and scapular retractions and posture training           Assessment: Patient with improved posture awareness, requires some cues to avoid excessive effort with cervical retraction that results in cervical compression rather than desired lengthening of spine. Goals: achieved at 8-12 sessions     1. Pt will be independent in beginning level of HEP for stretching, posture and strengthening. 2. Pt will demonstrate good scapulohumeral  mechanics awareness for prevention of reinjury and improved scapular upward rotation during AROM attempts with LUE. 3. Pt will be able to perform shoulder left  flexion to 140 deg and abd to 140 deg without significant increased symptoms and without significant scapular substitution. 4. Pt will be able to perform basic dressing/cooking/ light housework  without significantly increased symptoms. 5. Pt will be able to perform sleep postures without significant pain and  without sleep interruption. 6. Pt will demonstrate a significant reduction in pain at left  shoulder to allow for more tolerable ADLs with use of LUE  7.  Patient will demonstrate centralization of left UE p/n and radiating symptoms and reduction in max pain by 50-75% or more  Plan: Continue to progress exercises and improved humeral head centralization and progressive strengthening of shoulder cuff. Date: 11/29/2023  TX#: 2/8-12 Date:                 TX#: 3/ Date:                 TX#: 4/ Date:                 TX#: 5/ Date: Tx#: 6/   Therap ex:  30 mins       Cervical retractions: 5 x 5 secs  -scapular retractions 5 x 3 secs  -finger ladder R/L 42/36  Diaphragmatic breathing ex seated  -neck lateral flexion 3 x 15 secs bilateral  -OH pulley seated to tolerance. -supine passive stretch pect minor bilateral for one minute. Manual rx:  15 mins  -supine STM to both upper traps, both levator scap and both cervical p/s  -OA release supine  -gentle cervical long axis stretch  5 x 10 secs  -posterior glide left humeral head grade 3                      Charges: Therap ex x 2    man x 1   Total Timed Treatment: 45 min  Total Treatment Time: 45 min    Access Code: [email or web address]  URL: [employer]  Date: 11/27/2023  Prepared by: Guerda Lewis     Exercises  - Seated Cervical Retraction  - 1-2 x daily - 7 x weekly - 5 reps - 2 sec hold  - Seated Scapular Retraction  - 3-4 x daily - 7 x weekly - 1 sets - 5 reps - 3 sec hold    Access Code: [email or web address]  URL: [employer]  Date: 11/29/2023  Prepared by: Guerda Martinezby    Exercises  - Seated Cervical Retraction  - 1-2 x daily - 7 x weekly - 5 reps - 2 sec hold  - Seated Scapular Retraction  - 3-4 x daily - 7 x weekly - 1 sets - 5 reps - 3 sec hold  - Standing Cervical Sidebending AROM  - 2 x daily - 7 x weekly - 1 sets - 4 reps - 15 seconds  - Seated Diaphragmatic Breathing  - 3 x daily - 7 x weekly - 1 sets - 10 reps

## 2023-12-04 ENCOUNTER — OFFICE VISIT (OUTPATIENT)
Dept: PHYSICAL THERAPY | Facility: HOSPITAL | Age: 43
End: 2023-12-04
Attending: ORTHOPAEDIC SURGERY
Payer: MEDICARE

## 2023-12-04 PROCEDURE — 97110 THERAPEUTIC EXERCISES: CPT

## 2023-12-04 PROCEDURE — 97140 MANUAL THERAPY 1/> REGIONS: CPT

## 2023-12-04 NOTE — PROGRESS NOTES
Referring Physician: Art Rowley MD      Date of eval: 11/27/23   Diagnosis: Rotator cuff tendinitis, left (M75.82)  Biceps tendinitis of left shoulder (M75.22)  Degenerative disc disease, cervical (M50.30)  Left cervical radiculopathy (M54.12)  Precautions: presence of Syrinx on thoracic MRI, decreased DTR's, at bilateral UE reflex testing. [place] [id number] language line american sign language  Subjective: patient reports cleaning around home can stir up symptoms. Also reported soreness with distraction and more deep palpation at cervical spine. Patient also reports pain increases when attempting to lift 1 yo dtr but has been attempting to lift with right upper extremity. Right shoulder pain unchanged. 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   Max wt possibly           lifting dtr, 2 yo   Initial eval: Observation/Posture: bilateral winging of scapula, mild fwd head  posture and mild depressed sternum, left anterior humeral head glide and medial rotation        initial initial   Cervical AROM: deg Pain (+/-)   Flexion 40        5/10   Extension 42    3/10   R Sidebend 25 8/10   L Sidebend 30 6/10   R Rotation 52 4/10   L Rotation 55 4/10   Protrusion WNL 5/10   Retraction Slight dec 5/10            Shoulder AROM: deg      initial initial 11/29/23     R    L L   Flex 140 130  8/10 130 5/10   Abd 165 150  8/10    ER WNL 60  8/10    IR WNL WNL          Strength UE:   -/5 MMT    initial initial     R  L   Shoulder flex      5 4 pn   Shoulder ext 5 5   Abduction (C5) 5 4pn   ER 5 5-   IR 5 5-   Biceps (C6) 5 5-   Wrist ext (C6) 5 5   Triceps (C7) 5 5-pn   Wrist flex (C7) 5 5   Digit ext (C7) 5 5   Digit flex (C8) 5 5   EPL (C8)  5 5   Interossei (T1) NT NT                  Flexibility: WNL, min, mod, severe limitation   initial initial     R L   Upper trap minimally restricted moderately restricted   Levator scap minimally restricted moderately restricted   Scalenes minimally restricted moderately restricted   Pec Major moderately restricted moderately restricted   Pec Minor moderately restricted moderately restricted   Lats moderately restricted severely restricted      Palpation: tenderness at upper traps left and left medial aspect scapula, anterior deltoid left and left biceps tendons  Upper Limb Tension Tests: TBD  Neuro Screen: sensation intact currently BUE  Decreased bilateral biceps, triceps and brachioradialis DTR's  Accessory Motion: decreased posterior glide left humeral head  Special Tests: ziegler rubia left +   speeds + left, Spurling's reported to reproduce pain. Cervical distraction decreased symptoms       Patient was instructed in and issued HEP for: see below for cervical and scapular retractions and posture training           Assessment: Patient with improved posture awareness, requires some cues to avoid excessive effort with cervical retraction that results in cervical compression rather than desired lengthening of spine. Patient reports distraction painful, and deeper palpation. Goals: achieved at 8-12 sessions     1. Pt will be independent in beginning level of HEP for stretching, posture and strengthening. 2. Pt will demonstrate good scapulohumeral  mechanics awareness for prevention of reinjury and improved scapular upward rotation during AROM attempts with LUE. 3. Pt will be able to perform shoulder left  flexion to 140 deg and abd to 140 deg without significant increased symptoms and without significant scapular substitution. 4. Pt will be able to perform basic dressing/cooking/ light housework  without significantly increased symptoms. 5. Pt will be able to perform sleep postures without significant pain and  without sleep interruption.     6. Pt will demonstrate a significant reduction in pain at left shoulder to allow for more tolerable ADLs with use of LUE  7. Patient will demonstrate centralization of left UE p/n and radiating symptoms and reduction in max pain by 50-75% or more      Plan: Continue to progress exercises and improved humeral head centralization and progressive strengthening of shoulder cuff. Continue to encourage avoidance of straining with HEP and hold distraction long axis cervical and work on cervical stabilization and humeral head stabilization strengthening  Date: 11/29/2023  TX#: 2/8-12 Date: 12/4/23          TX#: 3/8-12 Date:                 TX#: 4/ Date:                 TX#: 5/ Date: Tx#: 6/   Therap ex:  30 mins Therap ex : 25 min      Cervical retractions: 5 x 5 secs  -scapular retractions 5 x 3 secs  -finger ladder R/L 42/36  Diaphragmatic breathing ex seated  -neck lateral flexion 3 x 15 secs bilateral  -OH pulley seated to tolerance. -supine passive stretch pect minor bilateral for one minute. Cervical retractions 5 x 5 secs  -scapular retractions  -neck lateral flexion 3 x 15 secs bilateral  -posture training with mirror and verbal feedback   -seated passive stretch pect minor bilateral for one minute  -Discussed importance of avoiding over straining with exercises reviewed with patient diaphragmatic breathing      Manual rx:  15 mins  -supine STM to both upper traps, both levator scap and both cervical p/s  -OA release supine  -gentle cervical long axis stretch  5 x 10 secs  -posterior glide left humeral head grade 3     Manual rx:  15 mins  -supine STM to both upper traps, both levator scap and both cervical p/s  -OA release supine gentle  -gentle cervical long axis stretch  2 x 10 secs  HELD DUE TO INCREASED PN  -posterior glide left humeral head grade 3 brief                 Charges: Therap ex x 2    man x 1   Total Timed Treatment: 40 min  Total Treatment Time: 40 min    Access Code: [email or web address]  URL: [email or web address]  Date: 11/27/2023  Prepared by: Kiya Mueller Pravin     Exercises  - Seated Cervical Retraction  - 1-2 x daily - 7 x weekly - 5 reps - 2 sec hold  - Seated Scapular Retraction  - 3-4 x daily - 7 x weekly - 1 sets - 5 reps - 3 sec hold    Access Code: [email or web address]  URL: [email or web address]  Date: 11/29/2023  Prepared by: Niki Owusu    Exercises  - Seated Cervical Retraction  - 1-2 x daily - 7 x weekly - 5 reps - 2 sec hold  - Seated Scapular Retraction  - 3-4 x daily - 7 x weekly - 1 sets - 5 reps - 3 sec hold  - Standing Cervical Sidebending AROM  - 2 x daily - 7 x weekly - 1 sets - 4 reps - 15 seconds  - Seated Diaphragmatic Breathing  - 3 x daily - 7 x weekly - 1 sets - 10 reps

## 2023-12-06 ENCOUNTER — OFFICE VISIT (OUTPATIENT)
Dept: PHYSICAL THERAPY | Facility: HOSPITAL | Age: 43
End: 2023-12-06
Attending: ORTHOPAEDIC SURGERY
Payer: MEDICARE

## 2023-12-06 PROCEDURE — 97110 THERAPEUTIC EXERCISES: CPT

## 2023-12-06 NOTE — PROGRESS NOTES
Referring Physician: Ray Felix MD      Date of eval: 11/27/23   Diagnosis: Rotator cuff tendinitis, left (M75.82)  Biceps tendinitis of left shoulder (M75.22)  Degenerative disc disease, cervical (M50.30)  Left cervical radiculopathy (M54.12)  Precautions: presence of Syrinx on thoracic MRI, decreased DTR's, at bilateral UE reflex testing. [facility] [id number] language line american sign language assist 12/6/23  Subjective: patient reports cleaning around home can stir up symptoms. Also reported soreness with distraction and more deep palpation at cervical spine. At work elevator was broken and patient did have to do more physical work with left shoulder. Still feeling popping at left shoulder at times and is frustrated that he has had neck and shoulder pain so long and wants to be better. 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   Max wt possibly           lifting dtr, 2 yo   Initial eval: Observation/Posture: bilateral winging of scapula, mild fwd head  posture and mild depressed sternum, left anterior humeral head glide and medial rotation        initial initial   Cervical AROM: deg Pain (+/-)   Flexion 40        5/10   Extension 42    3/10   R Sidebend 25 8/10   L Sidebend 30 6/10   R Rotation 52 4/10   L Rotation 55 4/10   Protrusion WNL 5/10   Retraction Slight dec 5/10            Shoulder AROM: deg      initial initial 11/29/23     R    L L   Flex 140 130  8/10 130 5/10   Abd 165 150  8/10    ER WNL 60  8/10    IR WNL WNL          Strength UE:   -/5 MMT    initial initial     R  L   Shoulder flex      5 4 pn   Shoulder ext 5 5   Abduction (C5) 5 4pn   ER 5 5-   IR 5 5-   Biceps (C6) 5 5-   Wrist ext (C6) 5 5   Triceps (C7) 5 5-pn   Wrist flex (C7) 5 5   Digit ext (C7) 5 5   Digit flex (C8) 5 5   EPL (C8)  5 5   Interossei (T1) NT NT Flexibility: WNL, min, mod, severe limitation   initial initial     R L   Upper trap minimally restricted moderately restricted   Levator scap minimally restricted moderately restricted   Scalenes minimally restricted moderately restricted   Pec Major moderately restricted moderately restricted   Pec Minor moderately restricted moderately restricted   Lats moderately restricted severely restricted      Palpation: tenderness at upper traps left and left medial aspect scapula, anterior deltoid left and left biceps tendons  Upper Limb Tension Tests: TBD  Neuro Screen: sensation intact currently BUE  Decreased bilateral biceps, triceps and brachioradialis DTR's  Accessory Motion: decreased posterior glide left humeral head  Special Tests: ziegler rubia left +   speeds + left, Spurling's reported to reproduce pain. Cervical distraction decreased symptoms       Patient was instructed in and issued HEP for: see below for cervical and scapular retractions and posture training           Assessment: Patient with improved posture awareness. Patient scheduled to see Neurosurgeon tomorrow for consult regarding neck pain. Still having significant pain with neck pain symptoms and also shoulder left symptoms. Attempted to explain to patient  purpose of PT at this stage of management to help understand role of care. Patient expressed understanding    Goals: achieved at 8-12 sessions     1. Pt will be independent in beginning level of HEP for stretching, posture and strengthening. 2. Pt will demonstrate good scapulohumeral  mechanics awareness for prevention of reinjury and improved scapular upward rotation during AROM attempts with LUE. 3. Pt will be able to perform shoulder left  flexion to 140 deg and abd to 140 deg without significant increased symptoms and without significant scapular substitution.       4. Pt will be able to perform basic dressing/cooking/ light housework  without significantly increased symptoms. 5. Pt will be able to perform sleep postures without significant pain and  without sleep interruption. 6. Pt will demonstrate a significant reduction in pain at left  shoulder to allow for more tolerable ADLs with use of LUE  7. Patient will demonstrate centralization of left UE p/n and radiating symptoms and reduction in max pain by 50-75% or more      Plan: Continue to progress exercises and improved humeral head centralization and progressive strengthening of shoulder cuff. Continue to encourage avoidance of straining with HEP and hold distraction long axis cervical and work on cervical stabilization and humeral head stabilization strengthening  Date: 11/29/2023  TX#: 2/8-12 Date: 12/4/23          TX#: 3/8-12 Date:   12/6/23              TX#: 4/8-12 Date:  12/               TX#: 5/8-12 Date: Tx#: 6/   Therap ex:  30 mins Therap ex : 25 min TE:      Cervical retractions: 5 x 5 secs  -scapular retractions 5 x 3 secs  -finger ladder R/L 42/36  Diaphragmatic breathing ex seated  -neck lateral flexion 3 x 15 secs bilateral  -OH pulley seated to tolerance. -supine passive stretch pect minor bilateral for one minute.   Cervical retractions 5 x 5 secs  -scapular retractions  -neck lateral flexion 3 x 15 secs bilateral  -posture training with mirror and verbal feedback   -seated passive stretch pect minor bilateral for one minute  -Discussed importance of avoiding over straining with exercises reviewed with patient diaphragmatic breathing Cervical retractions gentle 5 x 5 secs  Scapular retractions 5 x 5 secs  -Gentle seated OH pulley  1 mins  -chest press 2#  2 x 10 bilateral   -Triceps 2# 2 x 10 on left and right was 5#   -Biceps curls 5# left and right with instruction to avoid end range of motion for left if painful.  -Isometric contractions for  shoulder left IR and ER 5 x 5 secs @ 90 deg abd HELD FOR NEXT SESSSION     Manual rx:  15 mins  -supine STM to both upper traps, both levator scap and both cervical p/s  -OA release supine  -gentle cervical long axis stretch  5 x 10 secs  -posterior glide left humeral head grade 3     Manual rx:  15 mins  -supine STM to both upper traps, both levator scap and both cervical p/s  -OA release supine gentle  -gentle cervical long axis stretch  2 x 10 secs  HELD DUE TO INCREASED PN  -posterior glide left humeral head grade 3 brief Manual rx:  0 mins                Charges: Therap ex x 3       Total Timed Treatment: 40 min  Total Treatment Time: 40 min  [email or web address]   YTB and RTB  Access Code: [email or web address]  URL: [employer]/  Date: 11/27/2023  Prepared by: Freda Doe     Exercises  - Seated Cervical Retraction  - 1-2 x daily - 7 x weekly - 5 reps - 2 sec hold  - Seated Scapular Retraction  - 3-4 x daily - 7 x weekly - 1 sets - 5 reps - 3 sec hold    Access Code: [email or web address]  URL: [employer]/  Date: 11/29/2023  Prepared by: Freda Doe  Biceps 2,  shoulder abduction, no wt with, rows  with wife assist,    Exercises  - Seated Cervical Retraction  - 1-2 x daily - 7 x weekly - 5 reps - 2 sec hold  - Seated Scapular Retraction  - 3-4 x daily - 7 x weekly - 1 sets - 5 reps - 3 sec hold  - Standing Cervical Sidebending AROM  - 2 x daily - 7 x weekly - 1 sets - 4 reps - 15 seconds  - Seated Diaphragmatic Breathing  - 3 x daily - 7 x weekly - 1 sets - 10 reps    Access Code: [email or web address]  URL: [employer]/  Date: 12/06/2023  Prepared by: Freda Doe    Program Notes  Pito Sky,  these are the old and new exercises. Note that some have 5# and some with left have 2#. Use can of baked beans for 2#   Hold any if having increased pain.    Thanks Gonsalo    Exercises  - Seated Cervical Retraction  - 1-2 x daily - 7 x weekly - 5 reps - 2 sec hold  - Seated Scapular Retraction  - 3-4 x daily - 7 x weekly - 1 sets - 5 reps - 3 sec hold  - Standing Cervical Sidebending AROM  - 2 x daily - 7 x weekly - 1 sets - 4 reps - 15 seconds  - Seated Diaphragmatic Breathing  - 3 x daily - 7 x weekly - 1 sets - 10 reps  - Seated Bicep Curls Supinated with Dumbbells  - 1 x daily - 7 x weekly - 3 sets - 10 reps - 5 #  - Seated Single Arm Bicep Curls Neutral with Dumbbell  - 1 x daily - 7 x weekly - 3 sets - 10 reps - 5  #  - Supine Chest Press with Dumbbells  - 1 x daily - 7 x weekly - 3 sets - 10 reps - 5 #  - Seated Shoulder Row with Anchored Resistance  - 3 x weekly - 2 sets - 10 reps - red theraband resistance  - Supine Elbow Flexion Extension with Dumbbell (Mirrored)  - 3 x weekly - 2 sets - 10 reps - 5 #  - Supine Elbow Flexion Extension with Dumbbell  - 3 x weekly - 2 sets - 10 reps - 2 #

## 2023-12-11 ENCOUNTER — APPOINTMENT (OUTPATIENT)
Dept: PHYSICAL THERAPY | Facility: HOSPITAL | Age: 43
End: 2023-12-11
Attending: ORTHOPAEDIC SURGERY
Payer: MEDICARE

## 2023-12-13 ENCOUNTER — APPOINTMENT (OUTPATIENT)
Dept: PHYSICAL THERAPY | Facility: HOSPITAL | Age: 43
End: 2023-12-13
Attending: ORTHOPAEDIC SURGERY
Payer: MEDICARE

## 2024-01-03 ENCOUNTER — PATIENT MESSAGE (OUTPATIENT)
Dept: INTERNAL MEDICINE CLINIC | Facility: CLINIC | Age: 44
End: 2024-01-03

## 2024-01-03 NOTE — TELEPHONE ENCOUNTER
Responded  to [employer] message/question re: lack of any blood typing test results due to no medical necessity to perform this testing.

## 2024-01-07 ENCOUNTER — HOSPITAL ENCOUNTER (OUTPATIENT)
Dept: MRI IMAGING | Facility: HOSPITAL | Age: 44
Discharge: HOME | End: 2024-01-07
Attending: ORTHOPAEDIC SURGERY
Payer: MEDICARE

## 2024-01-07 ENCOUNTER — HOSPITAL ENCOUNTER (OUTPATIENT)
Dept: MRI IMAGING | Facility: HOSPITAL | Age: 44
End: 2024-01-07
Attending: ORTHOPAEDIC SURGERY
Payer: MEDICARE

## 2024-01-07 DIAGNOSIS — G95.0 SYRINX OF SPINAL CORD (HCC): ICD-10-CM

## 2024-01-07 PROCEDURE — A9575 INJ GADOTERATE MEGLUMI 0.1ML: HCPCS | Performed by: ORTHOPAEDIC SURGERY

## 2024-01-07 PROCEDURE — 72157 MRI CHEST SPINE W/O & W/DYE: CPT | Performed by: ORTHOPAEDIC SURGERY

## 2024-01-07 RX ORDER — GADOTERATE MEGLUMINE 376.9 MG/ML
15 INJECTION INTRAVENOUS
Status: COMPLETED | OUTPATIENT
Start: 2024-01-07 | End: 2024-01-07

## 2024-01-07 RX ADMIN — GADOTERATE MEGLUMINE 15 ML: 376.9 INJECTION INTRAVENOUS at 08:48:00

## 2024-01-15 ENCOUNTER — OFFICE VISIT (OUTPATIENT)
Dept: PAIN CLINIC | Facility: HOSPITAL | Age: 44
End: 2024-01-15
Attending: STUDENT IN AN ORGANIZED HEALTH CARE EDUCATION/TRAINING PROGRAM
Payer: MEDICARE

## 2024-01-15 VITALS
WEIGHT: 175 LBS | HEIGHT: 69 IN | RESPIRATION RATE: 18 BRPM | SYSTOLIC BLOOD PRESSURE: 121 MMHG | BODY MASS INDEX: 25.92 KG/M2 | HEART RATE: 73 BPM | DIASTOLIC BLOOD PRESSURE: 81 MMHG

## 2024-01-15 DIAGNOSIS — M54.12 LEFT CERVICAL RADICULOPATHY: Primary | ICD-10-CM

## 2024-01-15 PROCEDURE — 99202 OFFICE O/P NEW SF 15 MIN: CPT

## 2024-01-15 RX ORDER — IBUPROFEN 200 MG
200 TABLET ORAL EVERY 6 HOURS PRN
COMMUNITY

## 2024-01-15 NOTE — CHRONIC PAIN
Interventional Pain Medicine  New Patient Note    Subjective:     Referring Physician: Alejandro Gold MD  [address] [facility]  [address]  [place] [address]     Record Review: I personally reviewed orthopedics records    Chief Complaint: New Patient (Lt cervical neckinto shldr down lue(at [facility]))     History of Present Illness:  Jose Daniel Wadsworth is a 43 year old male presents for New Patient (Lt cervical neckinto shldr down lue(at [facility])). The patient and his accomapnying wife use sign language, the encounter was conducted with the help of an . The patient primarily complains of generalized weakness and aches throughout his body which has been getting progressively worse. He lately also noticed neck pain that radiates to upper left shoulder. He was seen by Dr Matute who referred him for consideration of AGNIESZKA. He also underwent cervical MRI scan that also revealed an upper thoracic  intramedullary syrinx.       Current Medication:    ibuprofen 200 MG Oral Tab Take 1 tablet (200 mg total) by mouth every 6 (six) hours as needed for Pain.        [COMPLETED] triamcinolone acetonide (Kenalog-40) 40 MG/ML injection 40 mg  40 mg Intra-articular Once Alejandro Gold MD   40 mg at 10/31/23 1147     Review of Systems:  CONSTITUTIONAL: denies fevers, chills  HEENT: denies swallowing difficulties, sore throat  CARDIOVASCULAR: denies chest pain, palpitations, syncope  RESPIRATORY: denies shortness of breath, cough, wheezing  GI: denies change in bowel habits, nausea, vomiting  : denies change in bladder function, frequency, dysuria  SKIN: denies rash, skin changes  MSK: Per HPI  NEURO: Per HPI  PSYCH: Per HPI      General Physical Exam:    Constitutional  Oriented to person, place, and time. Appears well-developed and   well-nourished  Head    Normocephalic and atraumatic.  Eyes    Pupils are equal, round, and reactive to light.  Neck    Neck supple  Cardiovascular  Minimal to no peripheral edema, intact  distal pulses  Pulmonary/Chest  Effort normal, no shortness of breath noted  Neurological   Alert and oriented to person, place, and time  Skin    Skin is warm and dry  Psychiatric   Normal mood and affect, behavior and judgment    Neurologic & Musculoskeletal Exam    Cervical    Region Exam Right  (+/-) Left  (+/-)   Cervical Musculature  Tender w/ Palpation - -   Cervical Facets Pain w/ Extension - -   Upper Extremity  Spurling's - -   Upper Extremity Bakody's - -       Sensation Right Left   Neck Normal Normal   C5: Shoulder Normal Normal   C6: Thumb, radial aspect of hand/forearm (Radial Nerve) Normal Normal   C7: Long finger (Median Nerve) Normal Normal   C8: Little finger, ulnar aspect of hand/forearm (Ulnar n.) Normal Normal   T1; Medial forearm/arm Normal Normal         Motor Strength Right Left   C5: Shoulder abduction (Deltoid) 5/5 5/5   C5: Elbow flexion (Biceps, Brachialis) 5/5 5/5   C6: Wrist extension (ECRB, ECRL) 5/5 5/5   C7: Elbow extension (Triceps) 5/5 5/5   C8: Finger flexion ( strength) 5/5 5/5   T1: Finger abduction  5/5 5/5       Reflexes Right Left   C5: Biceps 2/4 2/4   C6: Brachioradialis  2/4 2/4   C7: Triceps 2/4 2/4   Moore's Absent Absent   Clonus Absent Absent       Imaging:    Images of the following studies have been personally reviewed and my independent interpretation reveals as written:    MRI cervical spine from 11/12/23 shows loss of cervical lordosis, moderate  multilevel degenerative changes most pronounced at C5/6 and C6/7 with loss of disc height and signal intensity. Posterior disc osteophyte complex resulting in neuroforaminal stenosis. CSF signal upper thoracic intramedullary lesion likely representing a syrinx.     Assessment & Plan:  Jose Daniel Wadsworth is a 43 year old male with neck and arm pain     #Cervical Radicular Syndrome chronic worsening   -Symptoms in the C5 dermatome  -Discussed the option of MIKE at C7/T1 for radiculopathy   - Patient scheduled to meet  with neurosurgery on 1/25. He want to discuss the option of AGNIESZKA with neurosurgery too, and he will call [facility] if he wants to proceed with the injection.     Comprehensive analgesic plan was formulated. Conservative vs. Aggressive measures were discussed at length including pharmacotherapy (eg. Anti- inflammatories, muscle relaxants, neuropathic medications, oral steroids, analgesics), injections, and further testing. Risks and benefits of all options were discussed at length to patients satisfaction during a comprehensive interactive discussion. All questions were answered during extended questions and answer session. Patient agreeable to discussion plan. Greater than 50% of the time was spent with counseling (nature of discussion centered around pain, therapy, and treatment options), face to face time, time spent reviewing data, obtaining patient information and discussing the care with the patients health care providers.     Time spent: 45    Anthony Nguyễn MD, 01/20/24, 9:57 AM,e

## 2024-01-15 NOTE — PROGRESS NOTES
1/15/2024-presents ambulatory accompanied with his wife and family; here to establish care with [facility]; ALS INTERPRETOR required, referred by Dr. Gold; using the interpretor sign assessment and evaluation completed; pt reports c/o LT CERVICAL NECK PIAN INTO THE LT SHOULDER AT TIMES RADIATING DOWN THE LUE, ALSO C/O MID UPPER BACK PAIN; pt  reports this has been occurring for almost 2 yrs; denies injury/trauma; rates his his pain 8/10; examined by Dr. Nguyễn; refer to documentation for the plan of care

## 2024-01-29 ENCOUNTER — APPOINTMENT (OUTPATIENT)
Dept: PHYSICAL THERAPY | Facility: HOSPITAL | Age: 44
End: 2024-01-29
Attending: ORTHOPAEDIC SURGERY
Payer: MEDICARE

## 2024-02-01 ENCOUNTER — TELEPHONE (OUTPATIENT)
Dept: PHYSICAL THERAPY | Facility: HOSPITAL | Age: 44
End: 2024-02-01

## 2024-02-01 ENCOUNTER — APPOINTMENT (OUTPATIENT)
Dept: PHYSICAL THERAPY | Facility: HOSPITAL | Age: 44
End: 2024-02-01
Attending: ORTHOPAEDIC SURGERY
Payer: MEDICARE

## 2024-02-10 ENCOUNTER — HOSPITAL ENCOUNTER (OUTPATIENT)
Dept: GENERAL RADIOLOGY | Facility: HOSPITAL | Age: 44
Discharge: HOME | End: 2024-02-10
Payer: MEDICARE

## 2024-02-10 DIAGNOSIS — M25.30 INSTABILITY OF JOINT: ICD-10-CM

## 2024-02-10 PROCEDURE — 72050 X-RAY EXAM NECK SPINE 4/5VWS: CPT

## 2024-02-13 ENCOUNTER — TELEPHONE (OUTPATIENT)
Dept: SURGERY | Facility: CLINIC | Age: 44
End: 2024-02-13

## 2024-02-13 NOTE — TELEPHONE ENCOUNTER
Noted the patient message listed below.     Noted the patient LOV with KATIE Graves on 2/2/2024    \"Assessment/Plan:  Jose Daniel Wadsworth is a a(n) 44YO, male, who presents with neck and LUE pain which radiates to the deltoid. Patient was found to have a syrinx at T2 incidentally on cervical MRI for which a thoracic MRI was completed. It was again noted on the thoracic MRI. We discussed these images and what they mean. We will need additional imaging to determine the cause for the syrinx and we timbo order an MRI w/ and w/o contrast of the cervical spine. We will also order cervical flexion/extension xray to rule out instability. We will then plan to have patient return to the office for further imaging.    All questions and concerns were addressed. We appreciate the opportunity to participate in the care of this patient. Please do not hesitate to call our office with any issues.     This report will be submitted to the referring provider.    This note has been dictated utilizing voice recognition software. Unfortunately, this may lead to occasional typographical errors. If there are any questions regarding this, please do not hesitate to contact our office. \"

## 2024-02-13 NOTE — TELEPHONE ENCOUNTER
Patient had appointment on 2/2 with Dr. Alamo, Patient indicated that he was told to have two x-rays one of cervical spine AP LAT Flex Ext, and patient said he thought he was told to get an xray of drinking something? Patient not sure.  He received 1 x-ray order which he completed and 1 order for MRI cervical spine.     Patient needs to know if he is supposed to be getting x-ray drinking something.

## 2024-02-13 NOTE — TELEPHONE ENCOUNTER
MRI with and without contrast as well as cervical flex/ex is all the image we need.     I am wondering if the misunderstanding originates from the w/ contrast of the MRI. He likely thought he was supposed to drink this contrast  (as you would for an abdominal image with contrast) but rather it was placed through a peripheral IV.

## 2024-02-27 ENCOUNTER — APPOINTMENT (OUTPATIENT)
Dept: PHYSICAL THERAPY | Facility: HOSPITAL | Age: 44
End: 2024-02-27
Attending: ORTHOPAEDIC SURGERY
Payer: MEDICARE

## 2024-02-28 ENCOUNTER — TELEPHONE (OUTPATIENT)
Dept: PHYSICAL THERAPY | Facility: HOSPITAL | Age: 44
End: 2024-02-28

## 2024-03-05 ENCOUNTER — APPOINTMENT (OUTPATIENT)
Dept: PHYSICAL THERAPY | Facility: HOSPITAL | Age: 44
End: 2024-03-05
Attending: ORTHOPAEDIC SURGERY
Payer: MEDICARE

## 2024-03-09 ENCOUNTER — HOSPITAL ENCOUNTER (OUTPATIENT)
Dept: MRI IMAGING | Facility: HOSPITAL | Age: 44
Discharge: HOME | End: 2024-03-09
Payer: MEDICARE

## 2024-03-09 DIAGNOSIS — G95.0 SYRINX OF SPINAL CORD (HCC): ICD-10-CM

## 2024-03-09 DIAGNOSIS — M47.812 CERVICAL SPONDYLOSIS: ICD-10-CM

## 2024-03-09 DIAGNOSIS — M40.202 KYPHOSIS OF CERVICAL REGION, UNSPECIFIED KYPHOSIS TYPE: ICD-10-CM

## 2024-03-09 DIAGNOSIS — M54.12 CERVICAL RADICULOPATHY: ICD-10-CM

## 2024-03-09 PROCEDURE — A9575 INJ GADOTERATE MEGLUMI 0.1ML: HCPCS

## 2024-03-09 PROCEDURE — 72156 MRI NECK SPINE W/O & W/DYE: CPT

## 2024-03-09 RX ORDER — DIPHENHYDRAMINE HYDROCHLORIDE 50 MG/ML
10 INJECTION, SOLUTION INTRAMUSCULAR; INTRAVENOUS
Status: COMPLETED | OUTPATIENT
Start: 2024-03-09 | End: 2024-03-09

## 2024-03-09 RX ADMIN — DIPHENHYDRAMINE HYDROCHLORIDE 10 ML: 50 INJECTION, SOLUTION INTRAMUSCULAR; INTRAVENOUS at 09:54:00

## 2024-03-11 ENCOUNTER — TELEPHONE (OUTPATIENT)
Dept: SURGERY | Facility: CLINIC | Age: 44
End: 2024-03-11

## 2024-03-11 NOTE — TELEPHONE ENCOUNTER
[facility] report for MRI cervical spine DOS 03/09/24. Placed in nurse bin for review.

## 2024-03-13 NOTE — TELEPHONE ENCOUNTER
Received MRI spine cervical report DOS 03/09/24.    Paperwork faxed to [facility] for provider review.

## 2024-03-18 NOTE — TELEPHONE ENCOUNTER
Patient is hearing impaired and would like a my chart message sent on what is next step had MRI done and would like results and poc .

## 2024-03-18 NOTE — TELEPHONE ENCOUNTER
Noted the patient request for a [email or web address] message with MRI results and updates in plan of care.       Noted the patient completed MRI of the Cervical Spine on 3/9/2024    Noted the patient LOV on 2/2/2024 with Dr. Alamo:  \"Assessment/Plan:  Jose Daniel Wadsworth is a a(n) 42YO, male, who presents with neck and LUE pain which radiates to the deltoid. Patient was found to have a syrinx at T2 incidentally on cervical MRI for which a thoracic MRI was completed. It was again noted on the thoracic MRI. We discussed these images and what they mean. We will need additional imaging to determine the cause for the syrinx and we timbo order an MRI w/ and w/o contrast of the cervical spine. We will also order cervical flexion/extension xray to rule out instability. We will then plan to have patient return to the office for further imaging.\"    Routed to the PATEL to review imaging.

## 2024-04-02 ENCOUNTER — OFFICE VISIT (OUTPATIENT)
Dept: SURGERY | Facility: CLINIC | Age: 44
End: 2024-04-02
Payer: MEDICARE

## 2024-04-02 DIAGNOSIS — M79.601 PARESTHESIA AND PAIN OF BOTH UPPER EXTREMITIES: Primary | ICD-10-CM

## 2024-04-02 DIAGNOSIS — G95.0 SYRINGOMYELIA (HCC): ICD-10-CM

## 2024-04-02 DIAGNOSIS — M79.602 PARESTHESIA AND PAIN OF BOTH UPPER EXTREMITIES: Primary | ICD-10-CM

## 2024-04-02 DIAGNOSIS — R20.2 PARESTHESIA AND PAIN OF BOTH UPPER EXTREMITIES: Primary | ICD-10-CM

## 2024-04-02 PROCEDURE — 99214 OFFICE O/P EST MOD 30 MIN: CPT | Performed by: NEUROLOGICAL SURGERY

## 2024-04-02 NOTE — PROGRESS NOTES
AIMEE CASE M.D., F.A.A.N.S.     of Neurosurgery  [facility]  Board Certified Neurosurgeon                          [facility], [facility] Neurosurgery        [address]  [address] [place] [id number]    PHONE  [phone]          FAX  [phone]    [email or web address]      OFFICE FOLLOW-UP NOTE      Jose Daniel Wadsworth    : 1980    MRN: [id number]  CSN: [id number]      PCP: Hannah Cummins MD  Referring Provider: No ref. provider found    Insurance: Payor: MEDICARE / Plan: MEDICARE PART B ONLY / Product Type: *No Product type* /     Date of Visit: 2024    Reason for Visit:   Chief Complaint    Follow - Up                         History of Present Care:  Jose Daniel Wadsworth is a a(n) 43 year old, male.  Our patient has had nonspecific symptoms of neck discomfort and upper extremity numbness tingling as well as lower extremity numbness and tingling.  He had previously been diagnosed with an upper thoracic syrinx and I had ordered pre and postcontrast films as well as dynamic imaging to rule out the possibility of stenosis or CSF obstruction proximally to the lesion.  Furthermore, we wanted to exclude the presence of an intrinsic spinal cord lesion.      History:  .  Past Medical History:   Diagnosis Date    Deaf       Past Surgical History:   Procedure Laterality Date    HERNIA SURGERY        Family History   Problem Relation Age of Onset    Colon Cancer Father 81    Other (osteoarthritis) Mother     Other (digestive issues) Mother         unclear- had surgery      Social History     Socioeconomic History    Marital status:      Spouse name: Not on file    Number of children: Not on file    Years of education: Not on file    Highest education level: Not on file   Occupational History    Not on file   Tobacco Use    Smoking  status: Never    Smokeless tobacco: Never   Substance and Sexual Activity    Alcohol use: Never    Drug use: Never    Sexual activity: Not on file   Other Topics Concern    Caffeine Concern Not Asked    Exercise Not Asked    Seat Belt Not Asked    Special Diet Not Asked    Stress Concern Not Asked    Weight Concern Not Asked   Social History Narrative    Not on file     Social Determinants of Health     Financial Resource Strain: Not on file   Food Insecurity: Not on file   Transportation Needs: Not on file   Physical Activity: Not on file   Stress: Not on file   Social Connections: Not on file   Housing Stability: Not on file        Allergies:  No Known Allergies      Medications:  Current Outpatient Medications   Medication Sig Dispense Refill    ibuprofen 200 MG Oral Tab Take 1 tablet (200 mg total) by mouth every 6 (six) hours as needed for Pain.      triamcinolone 0.1 % External Cream Apply topically 2 (two) times daily as needed. For foot rash 15 g 1        Review of Systems:  A 10-point system was reviewed.  Pertinent positives and negatives are noted in HPI.      Physical Exam:  There were no vitals taken for this visit.        Neurological Exam:    AAOx3, following commands  PERRLA  EOMI  Face symmetrical  Tongue midline  Hearing symmetrical and intact to finger rub    No rhinorrhea or otorrhea    Romberg negative    Motor Strength:  Symmetrical in the upper extremities    Sensation to light touch:  Symmetrical    Incision:  None    Abdomen:  Soft, non-distended, non-tender, with no rebound or guarding.  No peritoneal signs.     Extremities:  Non-tender, no lower extremity edema noted.      Labs:  CBC:  Lab Results   Component Value Date    WBC 9.1 07/29/2023    HGB 14.8 07/29/2023    HCT 44.9 07/29/2023    MCV 89.4 07/29/2023    .0 07/29/2023      BMG:  Lab Results   Component Value Date     07/29/2023    K 3.7 07/29/2023    CO2 29.0 07/29/2023     07/29/2023    BUN 12 07/29/2023       INR:  No results found for: \"INR\", \"PROTIME\"       Diagnostics:  I reviewed an MRI with and without contrast, dated 9 March, 2024.  There is evidence of a syringomyelia at the T1-T2 without any evidence of enhancement, particularly in the postcontrast phase.  There is diffuse spondylotic disease of the cervical spine with no significant area of stenosis.    I reviewed flexion and extension films of the cervical spine without evidence of occult dynamic instability.    Diagnosis:  1. Paresthesia and pain of both upper extremities  - NEURO - INTERNAL    2. Syringomyelia (HCC)      Assessment/Plan:  Our patient has diffuse numbness and tingling in his extremities which does not necessarily correlate with the presence of the syrinx at T1-T2.  I understand the possibility of sensory changes in the lower extremities given the location of the lesion but the upper extremity symptoms do not necessarily make sense to me with regards to the anatomic location of the lesion.  There is no evidence of any surgical etiology at this point in time.  It could very well be related to a traumatic issue earlier in his life.  I have recommended that he should evaluate with a neurologist who might be able to further rule out other contributing factors to the paresthesias and managed to symptomatology in the long-term.  There are no acute neurosurgical issues at this time.    More than 30 minutes were spent during this visit and the coordination of this patient's care. All questions and concerns were addressed. We appreciate the opportunity to participate in the care of this patient. Please do not hesitate to call our office ([phone]) with any issues.        Carlos Alamo M.D., F.A.A.N.S.    4/2/2024  11:23 AM    This note has been dictated utilizing voice recognition software. Unfortunately, this may lead to occasional typographical errors. If there are any questions regarding this, please do not hesitate to contact our office.

## 2024-04-09 ENCOUNTER — APPOINTMENT (OUTPATIENT)
Dept: PHYSICAL THERAPY | Facility: HOSPITAL | Age: 44
End: 2024-04-09
Attending: ORTHOPAEDIC SURGERY
Payer: MEDICARE

## 2024-07-01 ENCOUNTER — PATIENT MESSAGE (OUTPATIENT)
Dept: INTERNAL MEDICINE CLINIC | Facility: CLINIC | Age: 44
End: 2024-07-01

## 2024-07-02 NOTE — TELEPHONE ENCOUNTER
Closing encounter.  PSR contacted pt via . Pt appt has been rescheduled for December appt and is on the wait list.

## 2024-07-02 NOTE — TELEPHONE ENCOUNTER
From: Jose Daniel Wadsworth  To: Hannah Cummins  Sent: 7/1/2024 6:13 AM CDT  Subject: Hello    Have appt on July 8th and I can’t make it but is there ahead appt coming up??

## 2024-08-08 ENCOUNTER — OFFICE VISIT (OUTPATIENT)
Dept: NEUROLOGY | Facility: CLINIC | Age: 44
End: 2024-08-08
Payer: MEDICARE

## 2024-08-08 DIAGNOSIS — R20.2 PARESTHESIA: Primary | ICD-10-CM

## 2024-08-08 DIAGNOSIS — G95.0 SYRINX OF SPINAL CORD (HCC): ICD-10-CM

## 2024-08-08 PROCEDURE — 99204 OFFICE O/P NEW MOD 45 MIN: CPT | Performed by: OTHER

## 2024-08-08 RX ORDER — AMITRIPTYLINE HYDROCHLORIDE 25 MG/1
TABLET, FILM COATED ORAL
Qty: 90 TABLET | Refills: 3 | Status: SHIPPED | OUTPATIENT
Start: 2024-08-08

## 2024-08-08 NOTE — PROGRESS NOTES
[facility]  [address] [place] [address], [address]  [facility] [address]  [phone]            Neurology Initial Visit     Referred By: Dr. Linares ref. provider found    Chief Complaint:   Chief Complaint   Patient presents with    Neurologic Problem     New patient presents with nerve N/T, pain and cramping  in his  muscle. Tingling goes down to the whole body. Pt states that because of pain he is unable sleep. He has pain,N/T on left UE neck, to shoulder to his arm . He also has this sensation in his back, chest and distal to his legs.       HPI:     Jose Daniel Wadsworth is a 44 year old male, who presents for numbness, tingling, paresthesias, cramping.  Apparently since his early 40s patient started developing the sensation that is usually intermittent, lasting for few hours.  Starting to involve entire body, numbness, tingling, and going from the neck down both arms both legs and the trunk.  It is difficult for him to fall asleep.  Especially gets worse after working as a  even notes relatively light duty job.  If he goes for hide it can trigger the pain as well.  He had MRI of the cervical paraspinous planned uncontrolled syrinx in the upper thoracic region.  He was seen by neurosurgeon, it was decided to avoid surgery at this point.  However it was not clear why his upper extremity were involved and therefore he was referred to our clinic.  At times when the pain is especially severe he developed some ankle swelling with redness.  Otherwise he denies any problems with gait or walking, no problems with urinary control either.  Patient is deaf..     Past Medical History:    Deaf       Past Surgical History:   Procedure Laterality Date    Hernia surgery         Social history:  History   Smoking Status    Never   Smokeless Tobacco    Never     History   Alcohol Use Never     History   Drug Use Unknown       Family History   Problem Relation Age of Onset    Colon Cancer Father 81     Other (osteoarthritis) Mother     Other (digestive issues) Mother         unclear- had surgery         Current Outpatient Medications:     amitriptyline 25 MG Oral Tab, Start with 1 pill QHS, for 1 week, then 2 pills qhs foor another week, then 3 pills qhs, Disp: 90 tablet, Rfl: 3    ibuprofen 200 MG Oral Tab, Take 1 tablet (200 mg total) by mouth every 6 (six) hours as needed for Pain., Disp: , Rfl:     triamcinolone 0.1 % External Cream, Apply topically 2 (two) times daily as needed. For foot rash, Disp: 15 g, Rfl: 1    No Known Allergies    ROS:   As in HPI, the rest of the 14 system review was done and was negative      Physical Exam:  There were no vitals filed for this visit.    General: No apparent distress, well nourished, well groomed.  Head- Normocephalic, atraumatic  Eyes- No redness or swelling  ENT- Hearing intake, normal glutition  Neck- No masses or adenopathy  Cv: pulses were palpable and normal, no cyanosis or edema     Neurological:     Mental Status- Alert difficult to assess fully due to the language barrier.  But able to answer all questions with  help    Cranial Nerves:    V. Facial sensation intact  VII. Face symmetric, no facial weakness  VIII. Hearing intact to whisper.  IX. Pallet elevates symmetrically.  XI. Shoulder shrug is intact  XII. Tongue is midline    Motor Exam:  Muscle tone normal  No atrophy or fasciculations  Strength- upper extremities 5/5 proximally and distally                  - lower  extremities 5/5 proximally and distally    Sensory Exam:  Light touch sensation- intact in all 4 extremities    Deep Tendon Reflexes:  Biceps 2+ bilateral symmetric  Triceps 2+ bilateral symmetric  Brachioradialis 2 + bilateral symmetric  Patellar 2+ bilateral symmetric  Ankle jerk 2+ bilateral symmetric    No clonus  No Babinski sign    Coordination:  Finger to nose intact  Rapid alternating movements intact    Gait:  Normal posture  Normal physiologic      Labs:    Lab Results    Component Value Date    TSH 0.769 07/29/2023     Lab Results   Component Value Date    HDL 46 07/29/2023     (H) 07/29/2023    TRIG 114 07/29/2023     Lab Results   Component Value Date    HGB 14.8 07/29/2023    HCT 44.9 07/29/2023    MCV 89.4 07/29/2023    WBC 9.1 07/29/2023    .0 07/29/2023      Lab Results   Component Value Date    BUN 12 07/29/2023    CA 8.9 07/29/2023    ALT 17 07/29/2023    AST 16 07/29/2023    ALB 3.6 07/29/2023     07/29/2023    K 3.7 07/29/2023     07/29/2023    CO2 29.0 07/29/2023      I have reviewed labs.    Imaging Studies:  I have independently reviewed imaging.  MRI of cervical and thoracic spine was independent reviewed as above.        Assessment   1. Paresthesia  Unclear etiology outside of upper thoracic cord syrinx.  Unclear how that involves the upper extremities however.  Therefore further workup will be initiated including MRI of the brain, blood work.  Patient has tried gabapentin with no benefit, therefore we will try amitriptyline next especially since his sleep is still disturbed.  Additional blood will be done and EMG of left upper and left lower extremity will be done as well to assess for any possibility of generalized neuropathic process  - amitriptyline 25 MG Oral Tab; Start with 1 pill QHS, for 1 week, then 2 pills qhs foor another week, then 3 pills qhs  Dispense: 90 tablet; Refill: 3  - MRI BRAIN (W+WO) (CPT=70553); Future  - Vitamin B12  - ANCA Panel Vasculitis; Future  - Immunofixation (FEROZ); Future  - Homocysteine; Future  - Folic Acid Serum (Folate); Future  - T Pallidum Screening [place]; Future  - TSH W Reflex To Free T4; Future  - Connective Tissue Disease (MANISH) Screen, Reflex Specific Antibody; Future  - EMG (at [facility]); Future    2. Syrinx of spinal cord (HCC)    - amitriptyline 25 MG Oral Tab; Start with 1 pill QHS, for 1 week, then 2 pills qhs foor another week, then 3 pills qhs  Dispense: 90 tablet; Refill: 3  - MRI  BRAIN (W+WO) (CPT=70553); Future  - Vitamin B12  - ANCA Panel Vasculitis; Future  - Immunofixation (FEROZ); Future  - Homocysteine; Future  - Folic Acid Serum (Folate); Future  - T Pallidum Screening [place]; Future  - TSH W Reflex To Free T4; Future  - Connective Tissue Disease (MANISH) Screen, Reflex Specific Antibody; Future  - EMG (at [facility]); Future           Education and counseling provided to patient. Instructed patient to call my office or seek medical attention immediately if symptoms worsen.  Patient verbalized understanding of information given. All questions were answered. All side effects of drugs were discussed.     Return to clinic in: No follow-ups on file.    Jc Salinas MD

## 2024-08-10 ENCOUNTER — LAB ENCOUNTER (OUTPATIENT)
Dept: LAB | Facility: HOSPITAL | Age: 44
End: 2024-08-10
Attending: Other
Payer: MEDICARE

## 2024-08-10 ENCOUNTER — PATIENT MESSAGE (OUTPATIENT)
Dept: NEUROLOGY | Facility: CLINIC | Age: 44
End: 2024-08-10

## 2024-08-10 DIAGNOSIS — G95.0 SYRINX OF SPINAL CORD (HCC): ICD-10-CM

## 2024-08-10 DIAGNOSIS — R20.2 PARESTHESIA: ICD-10-CM

## 2024-08-10 LAB
FOLATE SERPL-MCNC: 19.3 NG/ML (ref 5.4–?)
HCYS SERPL-SCNC: 16.2 UMOL/L (ref 3.7–13.9)
T PALLIDUM AB SER QL IA: NONREACTIVE
T3FREE SERPL-MCNC: 2.81 PG/ML (ref 2.4–4.2)
T4 FREE SERPL-MCNC: 1.2 NG/DL (ref 0.8–1.7)
TSI SER-ACNC: 0.51 MIU/ML (ref 0.55–4.78)
VIT B12 SERPL-MCNC: 478 PG/ML (ref 211–911)

## 2024-08-10 PROCEDURE — 82746 ASSAY OF FOLIC ACID SERUM: CPT

## 2024-08-10 PROCEDURE — 86225 DNA ANTIBODY NATIVE: CPT

## 2024-08-10 PROCEDURE — 86038 ANTINUCLEAR ANTIBODIES: CPT

## 2024-08-10 PROCEDURE — 36415 COLL VENOUS BLD VENIPUNCTURE: CPT

## 2024-08-10 PROCEDURE — 86334 IMMUNOFIX E-PHORESIS SERUM: CPT

## 2024-08-10 PROCEDURE — 84443 ASSAY THYROID STIM HORMONE: CPT

## 2024-08-10 PROCEDURE — 83516 IMMUNOASSAY NONANTIBODY: CPT

## 2024-08-10 PROCEDURE — 86780 TREPONEMA PALLIDUM: CPT

## 2024-08-10 PROCEDURE — 84439 ASSAY OF FREE THYROXINE: CPT

## 2024-08-10 PROCEDURE — 83090 ASSAY OF HOMOCYSTEINE: CPT

## 2024-08-10 PROCEDURE — 82607 VITAMIN B-12: CPT | Performed by: OTHER

## 2024-08-10 PROCEDURE — 84481 FREE ASSAY (FT-3): CPT

## 2024-08-10 PROCEDURE — 86037 ANCA TITER EACH ANTIBODY: CPT

## 2024-08-12 LAB
ANTI-MPO ANTIBODIES: <0.2 UNITS
ANTI-PR3 ANTIBODIES: <0.2 UNITS
DSDNA IGG SERPL IA-ACNC: 1 IU/ML
ENA AB SER QL IA: 0.1 UG/L
ENA AB SER QL IA: NEGATIVE

## 2024-08-12 NOTE — TELEPHONE ENCOUNTER
While possibility of increased risks of cardiovascular disease association with homocystine elevation I doubt is significant in your case.    I was checking more to look for possible vitamin deficiencies.  I will suggest to start multivitamin.

## 2024-08-12 NOTE — TELEPHONE ENCOUNTER
From: Jose Daniel Wadsworth  To: Jc Salinas  Sent: 8/10/2024 9:45 AM CDT  Subject: Hello    I got results and say one is very high that cause my blood clots??

## 2024-09-08 ENCOUNTER — HOSPITAL ENCOUNTER (OUTPATIENT)
Dept: MRI IMAGING | Facility: HOSPITAL | Age: 44
Discharge: HOME | End: 2024-09-08
Attending: Other
Payer: MEDICARE

## 2024-09-08 ENCOUNTER — HOSPITAL ENCOUNTER (OUTPATIENT)
Dept: MRI IMAGING | Facility: HOSPITAL | Age: 44
End: 2024-09-08
Attending: Other
Payer: MEDICARE

## 2024-09-08 DIAGNOSIS — G95.0 SYRINX OF SPINAL CORD (HCC): ICD-10-CM

## 2024-09-08 DIAGNOSIS — R20.2 PARESTHESIA: ICD-10-CM

## 2024-09-08 PROCEDURE — A9575 INJ GADOTERATE MEGLUMI 0.1ML: HCPCS | Performed by: OTHER

## 2024-09-08 PROCEDURE — 70553 MRI BRAIN STEM W/O & W/DYE: CPT | Performed by: OTHER

## 2024-09-08 RX ORDER — GADOTERATE MEGLUMINE 376.9 MG/ML
20 INJECTION INTRAVENOUS
Status: COMPLETED | OUTPATIENT
Start: 2024-09-08 | End: 2024-09-08

## 2024-09-08 RX ADMIN — GADOTERATE MEGLUMINE 17 ML: 376.9 INJECTION INTRAVENOUS at 15:03:00

## 2024-09-09 ENCOUNTER — PATIENT MESSAGE (OUTPATIENT)
Dept: NEUROLOGY | Facility: CLINIC | Age: 44
End: 2024-09-09

## 2024-09-16 NOTE — TELEPHONE ENCOUNTER
From: Jose Daniel Wadsworth  To: Jc Salinas  Sent: 9/9/2024 3:57 PM CDT  Subject: Hello    I see the result for mri head and it seem all good ?? Thanks

## 2024-10-23 ENCOUNTER — PROCEDURE VISIT (OUTPATIENT)
Dept: NEUROLOGY | Facility: CLINIC | Age: 44
End: 2024-10-23
Payer: MEDICARE

## 2024-10-23 DIAGNOSIS — G56.02 CARPAL TUNNEL SYNDROME OF LEFT WRIST: ICD-10-CM

## 2024-10-23 DIAGNOSIS — G89.29 CHRONIC LEFT SHOULDER PAIN: ICD-10-CM

## 2024-10-23 DIAGNOSIS — G56.22 ULNAR NEUROPATHY AT ELBOW OF LEFT UPPER EXTREMITY: Primary | ICD-10-CM

## 2024-10-23 DIAGNOSIS — G95.0 SYRINX OF SPINAL CORD (HCC): ICD-10-CM

## 2024-10-23 DIAGNOSIS — M25.512 CHRONIC LEFT SHOULDER PAIN: ICD-10-CM

## 2024-10-23 DIAGNOSIS — R20.2 PARESTHESIA: ICD-10-CM

## 2024-10-23 PROCEDURE — 95886 MUSC TEST DONE W/N TEST COMP: CPT | Performed by: OTHER

## 2024-10-23 PROCEDURE — 95911 NRV CNDJ TEST 9-10 STUDIES: CPT | Performed by: OTHER

## 2024-10-23 NOTE — PROCEDURES
HISTORY:    Jose Daniel Wadsworth is a 44 year old male with a complaint of pain in the shoulder, numbness in the left medial forearm.    PHYSICAL:    Cranial nerves grossly normal. Motor examination showed strength to be 5 of 5 throughout.     Sensory examination showed no abnormalities of pin prick or light touch.       TECHNICAL SUMMARY:    There were no significant technical difficulty encountered during this study.  Nerve conduction studies were performed without warming with skin temperature between 32-33 degrees centigrade.    SUMMARY:    Left median and ulnar sensory nerves were tested.  Left median and ulnar motor nerves were tested.  Left median and ulnar palmar orthodromic nerves were tested.    Left peroneal and tibial motor nerves were tested.  Left sural and superficial peroneal nerve were tested.    The motor conduction test was performed on 4 nerve(s). The results were normal in 3 nerve(s): L Median - APB, L Deep peroneal (Fibular) - EDB, L Tibial - AH. Results outside the specified normal range were found in 1 nerve(s), as follows:  In the L Ulnar - ADM study  the velocity was reduced for A.Elbow - B.Elbow segment    The sensory conduction test was performed on 4 nerve(s). The results were normal in 3 nerve(s): L Median - DigII UlnarV, L Sural - Lat Mall, L Superficial peroneal - Ankle. Results outside the specified normal range were found in 1 nerve(s), as follows:  In the L Median - Ulnar Palmar study  the peak latency was increased for Median stimulation  the peak latency difference was increased for Median - Wrist segment    The needle EMG study was normal in all 12 tested muscles: L. Vastus medialis, L. Vastus lateralis, L. Biceps femoris (short head), L. Gastrocnemius (Medial head), L. Tibialis anterior, L. Dorsal interossei (pedis), L. Deltoid, L. Biceps brachii, L. Triceps brachii, L. Extensor digitorum communis, L. Pronator teres, L. First dorsal interosseous.             Motor NCS      Nerve /  Sites Muscle Latency Amplitude Rel Amp Durat Segments Distance Lat Diff Velocity     ms mV % ms  cm ms m/s   L Median - APB      Wrist APB 3.69 6.6 100 7.35 Wrist - APB 8  22      Elbow APB 8.62 6.3 94.9 7.69 Elbow - Wrist 27 4.94 55   L Ulnar - ADM      Wrist ADM 2.50 10.2 100 6.29 Wrist - ADM         B.Elbow ADM 6.29 8.8 85.6 6.48 B.Elbow - Wrist 23 3.79 61      A.Elbow ADM 8.44 10.0 114 6.62 A.Elbow - B.Elbow 9 2.15 42       Motor NCS      Nerve / Sites Muscle Latency Amplitude Durat Segments Distance Lat Diff Velocity     ms mV ms  cm ms m/s   L Deep peroneal (Fibular) - EDB      Ankle EDB 6.19 3.3 5.58 Ankle - EDB 8  13      Ref.  <=6.50 >=1.3  Ref.         Fib Head EDB 14.21 3.3  Fib Head - Ankle 33 8.02 41      Ref.     Ref.   >=38      Knee EDB 15.90 3.1 6.54 Knee - Fib Head 11 1.69 65      Ref.     Ref.   >=38   L Tibial - AH      Ankle AH 4.96 10.6 3.65 Ankle - AH 8  16      Ref.  <=6.00 >=4.4  Ref.         Knee AH 14.10 7.6 5.21 Knee - Ankle 46 9.15 50      Ref.     Ref.   >=39       Sensory NCS      Nerve / Sites Rec. Site Onset Lat Peak Lat NP Amp PP Amp Segments Peak Diff     ms ms µV µV  ms   L Median - DigII UlnarV      Median Wrist Dig II 2.73 3.63 8.8 20.9 Median Wrist - Dig II       Ulnar Wrist Dig V 2.50 3.17 12.5 35.6 Ulnar Wrist - Median Wrist -0.46       Sensory NCS - CTS      Nerve / Sites Rec. Site Onset Lat Peak Lat NP Amp PP Amp Segments Dist Peak Diff Velocity     ms ms µV µV  cm ms m/s   L Median - Ulnar Palmar      Median Wrist 1.88 2.27 18.6 31.2 Median - Wrist 8  42.7      Ulnar Wrist 1.50 1.92 9.7 17.7 Ulnar - Median  -0.354        Sensory NCS      Nerve / Sites Rec. Site Onset Lat Peak Lat NP Amp PP Amp Segments Distance Velocity     ms ms µV µV  cm m/s   L Sural - Lat Mall      Calf Lat Mall 2.54 3.46 18.9 8.2 Calf - Lat Mall 14 55      Ref.   <=4.50 >=5.0 >=5.0 Ref.  >=40   L Superficial peroneal - Ankle      Lat leg Ankle 2.67 3.52 16.1 16.5 Lat leg - Ankle 14 52      Ref.    <=4.50 >=4.0 >=4.0 Ref.         EMG Summary Table     Spontaneous MUAP Recruitment   Muscle Nerve Roots IA Fib PSW Fasc H.F. Comments Amp Dur. PPP Pattern   L. Vastus medialis Femoral L2-L4 N None None None None Normal N N N N   L. Vastus lateralis Femoral L2-L4 N None None None None Normal N N N N   L. Biceps femoris (short head) Sciatic (peroneal division) L5-S2 N None None None None Normal N N N N   L. Gastrocnemius (Medial head) Tibial S1-S2 N None None None None Normal N N N N   L. Tibialis anterior Deep peroneal (Fibular) L4-L5 N None None None None Normal N N N N   L. Dorsal interossei (pedis) Medial plantar S1-S2 N None None None None Normal N N N N   L. Deltoid Axillary C5-C6 N None None None None Normal N N N N   L. Biceps brachii Musculocutaneous C5-C6 N None None None None Normal N N N N   L. Triceps brachii Radial C6-C8 N None None None None Normal N N N N   L. Extensor digitorum communis Radial C7-C8 N None None None None Normal N N N N   L. Pronator teres Median C6-C7 N None None None None Normal N N N N   L. 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    There is electrodiagnostic evidence of median mononeuropathy, relatively mild across the left wrist, possible carpal tunnel syndrome.    There is no electrodiagnostic evidence of any other mononeuropathy, plexopathy, peripheral polyneuropathy, inflammatory/necrotizing myopathy or cervical or lumbar radiculopathy affecting left upper or left lower extremity.    CLINICAL CORRELATION:    These abnormal findings could account for numbness in the left forearm and hand.  However when he went to explain the significant pain in the shoulder.  Therefore we will start with physical therapy for the shoulder pain, but also occupational therapy for the ulnar neuropathy  and patient was instructed to start wearing the brace for the left wrist over the next 2 months..    Jc Salinas MD

## 2024-11-04 ENCOUNTER — PATIENT MESSAGE (OUTPATIENT)
Dept: NEUROLOGY | Facility: CLINIC | Age: 44
End: 2024-11-04

## 2024-11-06 ENCOUNTER — TELEPHONE (OUTPATIENT)
Dept: NEUROLOGY | Facility: CLINIC | Age: 44
End: 2024-11-06

## 2024-12-09 ENCOUNTER — OFFICE VISIT (OUTPATIENT)
Dept: INTERNAL MEDICINE CLINIC | Facility: CLINIC | Age: 44
End: 2024-12-09
Payer: MEDICARE

## 2024-12-09 VITALS
HEART RATE: 84 BPM | BODY MASS INDEX: 24.89 KG/M2 | SYSTOLIC BLOOD PRESSURE: 104 MMHG | OXYGEN SATURATION: 96 % | WEIGHT: 170 LBS | DIASTOLIC BLOOD PRESSURE: 70 MMHG | HEIGHT: 69.29 IN | TEMPERATURE: 98 F

## 2024-12-09 DIAGNOSIS — Z23 NEED FOR VACCINATION: ICD-10-CM

## 2024-12-09 DIAGNOSIS — G56.22 ULNAR NEUROPATHY AT ELBOW OF LEFT UPPER EXTREMITY: ICD-10-CM

## 2024-12-09 DIAGNOSIS — G89.29 CHRONIC LEFT SHOULDER PAIN: ICD-10-CM

## 2024-12-09 DIAGNOSIS — Z00.00 WELLNESS EXAMINATION: Primary | ICD-10-CM

## 2024-12-09 DIAGNOSIS — Z12.11 COLON CANCER SCREENING: ICD-10-CM

## 2024-12-09 DIAGNOSIS — M54.16 LUMBAR RADICULOPATHY: ICD-10-CM

## 2024-12-09 DIAGNOSIS — M25.512 CHRONIC LEFT SHOULDER PAIN: ICD-10-CM

## 2024-12-09 DIAGNOSIS — M54.12 CERVICAL RADICULOPATHY: ICD-10-CM

## 2024-12-09 PROCEDURE — 90656 IIV3 VACC NO PRSV 0.5 ML IM: CPT | Performed by: FAMILY MEDICINE

## 2024-12-09 PROCEDURE — G0008 ADMIN INFLUENZA VIRUS VAC: HCPCS | Performed by: FAMILY MEDICINE

## 2024-12-09 PROCEDURE — G0439 PPPS, SUBSEQ VISIT: HCPCS | Performed by: FAMILY MEDICINE

## 2024-12-09 PROCEDURE — 99213 OFFICE O/P EST LOW 20 MIN: CPT | Performed by: FAMILY MEDICINE

## 2024-12-09 RX ORDER — PREGABALIN 50 MG/1
50 CAPSULE ORAL 2 TIMES DAILY PRN
Qty: 60 CAPSULE | Refills: 1 | Status: SHIPPED | OUTPATIENT
Start: 2024-12-09

## 2024-12-14 ENCOUNTER — LAB ENCOUNTER (OUTPATIENT)
Dept: LAB | Facility: HOSPITAL | Age: 44
End: 2024-12-14
Attending: FAMILY MEDICINE
Payer: MEDICARE

## 2024-12-14 ENCOUNTER — PATIENT MESSAGE (OUTPATIENT)
Dept: INTERNAL MEDICINE CLINIC | Facility: CLINIC | Age: 44
End: 2024-12-14

## 2024-12-14 DIAGNOSIS — Z00.00 WELLNESS EXAMINATION: ICD-10-CM

## 2024-12-14 LAB
ALBUMIN SERPL-MCNC: 4.6 G/DL (ref 3.2–4.8)
ALBUMIN/GLOB SERPL: 1.7 {RATIO} (ref 1–2)
ALP LIVER SERPL-CCNC: 62 U/L
ALT SERPL-CCNC: 14 U/L
ANION GAP SERPL CALC-SCNC: 8 MMOL/L (ref 0–18)
AST SERPL-CCNC: 22 U/L (ref ?–34)
BASOPHILS # BLD AUTO: 0.04 X10(3) UL (ref 0–0.2)
BASOPHILS NFR BLD AUTO: 0.5 %
BILIRUB SERPL-MCNC: 0.8 MG/DL (ref 0.3–1.2)
BILIRUB UR QL: NEGATIVE
BUN BLD-MCNC: 12 MG/DL (ref 9–23)
BUN/CREAT SERPL: 10.1 (ref 10–20)
CALCIUM BLD-MCNC: 9.8 MG/DL (ref 8.7–10.4)
CHLORIDE SERPL-SCNC: 104 MMOL/L (ref 98–112)
CHOLEST SERPL-MCNC: 235 MG/DL (ref ?–200)
CLARITY UR: CLEAR
CO2 SERPL-SCNC: 29 MMOL/L (ref 21–32)
COLOR UR: YELLOW
CREAT BLD-MCNC: 1.19 MG/DL
DEPRECATED RDW RBC AUTO: 41.2 FL (ref 35.1–46.3)
EGFRCR SERPLBLD CKD-EPI 2021: 77 ML/MIN/1.73M2 (ref 60–?)
EOSINOPHIL # BLD AUTO: 0.2 X10(3) UL (ref 0–0.7)
EOSINOPHIL NFR BLD AUTO: 2.3 %
ERYTHROCYTE [DISTWIDTH] IN BLOOD BY AUTOMATED COUNT: 12.5 % (ref 11–15)
EST. AVERAGE GLUCOSE BLD GHB EST-MCNC: 105 MG/DL (ref 68–126)
FASTING PATIENT LIPID ANSWER: YES
FASTING STATUS PATIENT QL REPORTED: YES
GLOBULIN PLAS-MCNC: 2.7 G/DL (ref 2–3.5)
GLUCOSE BLD-MCNC: 115 MG/DL (ref 70–99)
GLUCOSE UR-MCNC: NORMAL MG/DL
HBA1C MFR BLD: 5.3 % (ref ?–5.7)
HCT VFR BLD AUTO: 42.9 %
HDLC SERPL-MCNC: 51 MG/DL (ref 40–59)
HGB BLD-MCNC: 15 G/DL
HGB UR QL STRIP.AUTO: NEGATIVE
IMM GRANULOCYTES # BLD AUTO: 0.02 X10(3) UL (ref 0–1)
IMM GRANULOCYTES NFR BLD: 0.2 %
KETONES UR-MCNC: NEGATIVE MG/DL
LDLC SERPL CALC-MCNC: 166 MG/DL (ref ?–100)
LEUKOCYTE ESTERASE UR QL STRIP.AUTO: NEGATIVE
LYMPHOCYTES # BLD AUTO: 2.52 X10(3) UL (ref 1–4)
LYMPHOCYTES NFR BLD AUTO: 28.8 %
MCH RBC QN AUTO: 31.5 PG (ref 26–34)
MCHC RBC AUTO-ENTMCNC: 35 G/DL (ref 31–37)
MCV RBC AUTO: 90.1 FL
MONOCYTES # BLD AUTO: 0.55 X10(3) UL (ref 0.1–1)
MONOCYTES NFR BLD AUTO: 6.3 %
NEUTROPHILS # BLD AUTO: 5.42 X10 (3) UL (ref 1.5–7.7)
NEUTROPHILS # BLD AUTO: 5.42 X10(3) UL (ref 1.5–7.7)
NEUTROPHILS NFR BLD AUTO: 61.9 %
NITRITE UR QL STRIP.AUTO: NEGATIVE
NONHDLC SERPL-MCNC: 184 MG/DL (ref ?–130)
OSMOLALITY SERPL CALC.SUM OF ELEC: 293 MOSM/KG (ref 275–295)
PH UR: 5.5 [PH] (ref 5–8)
PLATELET # BLD AUTO: 240 10(3)UL (ref 150–450)
POTASSIUM SERPL-SCNC: 3.5 MMOL/L (ref 3.5–5.1)
PROT SERPL-MCNC: 7.3 G/DL (ref 5.7–8.2)
RBC # BLD AUTO: 4.76 X10(6)UL
SODIUM SERPL-SCNC: 141 MMOL/L (ref 136–145)
SP GR UR STRIP: 1.03 (ref 1–1.03)
TRIGL SERPL-MCNC: 101 MG/DL (ref 30–149)
TSI SER-ACNC: 1.14 UIU/ML (ref 0.55–4.78)
UROBILINOGEN UR STRIP-ACNC: NORMAL
VLDLC SERPL CALC-MCNC: 20 MG/DL (ref 0–30)
WBC # BLD AUTO: 8.8 X10(3) UL (ref 4–11)

## 2024-12-14 PROCEDURE — 83036 HEMOGLOBIN GLYCOSYLATED A1C: CPT

## 2024-12-14 PROCEDURE — 84443 ASSAY THYROID STIM HORMONE: CPT

## 2024-12-14 PROCEDURE — 85025 COMPLETE CBC W/AUTO DIFF WBC: CPT | Performed by: FAMILY MEDICINE

## 2024-12-14 PROCEDURE — 80061 LIPID PANEL: CPT

## 2024-12-14 PROCEDURE — 80053 COMPREHEN METABOLIC PANEL: CPT

## 2024-12-14 PROCEDURE — 36415 COLL VENOUS BLD VENIPUNCTURE: CPT | Performed by: FAMILY MEDICINE

## 2024-12-14 PROCEDURE — 81003 URINALYSIS AUTO W/O SCOPE: CPT

## 2024-12-14 NOTE — PROGRESS NOTES
CC: Annual Physical Exam     HPI:   Jose Daniel Wadsworth is a 44 year old male who presents for a complete physical exam.     ASL  present via video    HCM  -Diet:  Well-balanced  -Exercise: active  -Mental Health: denies any depression or anxiety sx    Stil having neck/shoulder pain and ulnar neuropathy issues. Saw neuro and neurosurgery. Told to do PT. Hard to do PT in hospital due to less after hour availability. Still having pain.  Also low back pain with some intermittent radiculopathy as well. No causa equina sx      Wt Readings from Last 6 Encounters:   12/09/24 170 lb (77.1 kg)   01/15/24 175 lb (79.4 kg)   07/25/23 183 lb (83 kg)   04/03/23 178 lb (80.7 kg)   06/30/22 170 lb 6.4 oz (77.3 kg)     Body mass index is 24.89 kg/m².     Results for orders placed or performed in visit on 12/09/24   CBC With Differential With Platelet    Collection Time: 12/14/24  7:10 AM   Result Value Ref Range    WBC 8.8 4.0 - 11.0 x10(3) uL    RBC 4.76 4.30 - 5.70 x10(6)uL    HGB 15.0 13.0 - 17.5 g/dL    HCT 42.9 39.0 - 53.0 %    MCV 90.1 80.0 - 100.0 fL    MCH 31.5 26.0 - 34.0 pg    MCHC 35.0 31.0 - 37.0 g/dL    RDW-SD 41.2 35.1 - 46.3 fL    RDW 12.5 11.0 - 15.0 %    .0 150.0 - 450.0 10(3)uL    Neutrophil Absolute Prelim 5.42 1.50 - 7.70 x10 (3) uL    Neutrophil Absolute 5.42 1.50 - 7.70 x10(3) uL    Lymphocyte Absolute 2.52 1.00 - 4.00 x10(3) uL    Monocyte Absolute 0.55 0.10 - 1.00 x10(3) uL    Eosinophil Absolute 0.20 0.00 - 0.70 x10(3) uL    Basophil Absolute 0.04 0.00 - 0.20 x10(3) uL    Immature Granulocyte Absolute 0.02 0.00 - 1.00 x10(3) uL    Neutrophil % 61.9 %    Lymphocyte % 28.8 %    Monocyte % 6.3 %    Eosinophil % 2.3 %    Basophil % 0.5 %    Immature Granulocyte % 0.2 %         Current Outpatient Medications   Medication Sig Dispense Refill    pregabalin (LYRICA) 50 MG Oral Cap Take 1 capsule (50 mg total) by mouth 2 (two) times daily as needed (nerve pain). 60 capsule 1    ibuprofen 200 MG Oral Tab  Take 1 tablet (200 mg total) by mouth every 6 (six) hours as needed for Pain.        Allergies[1]   Past Medical History:    Deaf      Past Surgical History:   Procedure Laterality Date    Hernia surgery        Family History   Problem Relation Age of Onset    Colon Cancer Father 81    Other (osteoarthritis) Mother     Other (digestive issues) Mother         unclear- had surgery      Social History:  Social History     Socioeconomic History    Marital status:    Tobacco Use    Smoking status: Never    Smokeless tobacco: Never   Substance and Sexual Activity    Alcohol use: Never    Drug use: Never             REVIEW OF SYSTEMS:   Review of Systems   Constitutional:  Negative for chills, fatigue and fever.   Respiratory:  Negative for cough and shortness of breath.    Cardiovascular:  Negative for chest pain, palpitations and leg swelling.   Gastrointestinal:  Negative for abdominal pain, constipation, diarrhea and vomiting.   Musculoskeletal:  Positive for arthralgias, back pain and neck pain.   Neurological:  Negative for headaches.   All other systems reviewed and are negative.           PHYSICAL EXAM:   /70   Pulse 84   Temp 98.1 °F (36.7 °C)   Ht 5' 9.29\" (1.76 m)   Wt 170 lb (77.1 kg)   SpO2 96%   BMI 24.89 kg/m²  Estimated body mass index is 24.89 kg/m² as calculated from the following:    Height as of this encounter: 5' 9.29\" (1.76 m).    Weight as of this encounter: 170 lb (77.1 kg).     Wt Readings from Last 3 Encounters:   12/09/24 170 lb (77.1 kg)   01/15/24 175 lb (79.4 kg)   07/25/23 183 lb (83 kg)       Physical Exam  Vitals reviewed.   Constitutional:       General: He is not in acute distress.     Appearance: He is well-developed.   HENT:      Head: Normocephalic.      Right Ear: Tympanic membrane and external ear normal.      Left Ear: Tympanic membrane and external ear normal.   Eyes:      Conjunctiva/sclera: Conjunctivae normal.      Pupils: Pupils are equal, round, and reactive  to light.   Neck:      Thyroid: No thyromegaly.   Cardiovascular:      Rate and Rhythm: Normal rate and regular rhythm.      Heart sounds: Normal heart sounds. No murmur heard.  Pulmonary:      Effort: Pulmonary effort is normal. No respiratory distress.      Breath sounds: Normal breath sounds.   Abdominal:      General: Bowel sounds are normal. There is no distension.      Palpations: Abdomen is soft.      Tenderness: There is no abdominal tenderness.   Musculoskeletal:         General: No swelling or tenderness. Normal range of motion.      Cervical back: Normal range of motion and neck supple.      Right lower leg: No edema.      Left lower leg: No edema.   Skin:     Findings: No rash.   Neurological:      General: No focal deficit present.      Cranial Nerves: No cranial nerve deficit.      Sensory: No sensory deficit.      Motor: No weakness.      Coordination: Coordination normal.      Gait: Gait normal.           ASSESSMENT AND PLAN:   Jose Daniel Wadsworth is a 44 year old male who presents for a complete physical exam.       Health maintenance, will check:   Orders Placed This Encounter   Procedures    CBC With Differential With Platelet    Comp Metabolic Panel (14)    Hemoglobin A1C    Lipid Panel    TSH W Reflex To Free T4    Urinalysis, Routine    Fluzone Trivalent Vaccine, 6mo+ (77947)       Diagnoses and all orders for this visit:    Wellness examination  -     CBC With Differential With Platelet  -     Comp Metabolic Panel (14); Future  -     Hemoglobin A1C; Future  -     Lipid Panel; Future  -     TSH W Reflex To Free T4; Future  -     Urinalysis, Routine; Future  -     Pt reassured and all questions answered.  -     Age/sex specific preventive measures/immunizations reviewed and discussed with pt.  -     Pt counseled with regards to diet and exercise.    Cervical radiculopathy  Chronic left shoulder pain  Ulnar neuropathy at elbow of left upper extremity  Lumbar radiculopathy  -     Physical Therapy  Referral - External  -     pregabalin (LYRICA) 50 MG Oral Cap; Take 1 capsule (50 mg total) by mouth 2 (two) times daily as needed (nerve pain).  -     Physiatry Referral - In Network  -s/p neuro and neurosurgery  -sending physiatry and PT  -trial of lyrica. SE discussed  -supportive care PRN    Colon cancer screening  -     Gastro GI Telephone Colon Screen; Future    Need for vaccination  -     Fluzone Trivalent Vaccine, 6mo+ (62043)         Health Maintenance Due   Topic Date Due    Colorectal Cancer Screening  Never done    DTaP,Tdap,and Td Vaccines (1 - Tdap) Never done    Annual Physical  07/25/2024    COVID-19 Vaccine (1 - 2024-25 season) Never done         The patient indicates understanding of these issues and agrees to the plan.  Return if symptoms worsen or fail to improve.  Reasurrance and education provided. All questions answered. Red flags/ ER precautions discussed.    Spent 30 minutes (10 in preventative and 20 in acute/chronic) including chart review, reviewing appropriate medical history, evaluating patient, discussing treatment options, counseling and education (diet and exercise), ordering appropriate diagnostic tests and completing documentation.             [1] No Known Allergies

## 2025-02-11 ENCOUNTER — OFFICE VISIT (OUTPATIENT)
Facility: CLINIC | Age: 45
End: 2025-02-11
Payer: MEDICARE

## 2025-02-11 ENCOUNTER — TELEPHONE (OUTPATIENT)
Facility: CLINIC | Age: 45
End: 2025-02-11

## 2025-02-11 VITALS
SYSTOLIC BLOOD PRESSURE: 101 MMHG | HEIGHT: 69.29 IN | BODY MASS INDEX: 25 KG/M2 | HEART RATE: 82 BPM | DIASTOLIC BLOOD PRESSURE: 67 MMHG

## 2025-02-11 DIAGNOSIS — Z12.11 SCREENING FOR COLON CANCER: Primary | ICD-10-CM

## 2025-02-11 DIAGNOSIS — Z12.11 COLON CANCER SCREENING: Primary | ICD-10-CM

## 2025-02-11 PROCEDURE — 99204 OFFICE O/P NEW MOD 45 MIN: CPT | Performed by: NURSE PRACTITIONER

## 2025-02-11 NOTE — H&P
[facility] - Gastroenterology                                                                                                  Clinic History and Physical     Chief Complaint   Patient presents with    Consult     No previous CLN        Requesting physician or provider: Hannah Cummins MD    HPI:   Jose Daniel Wadsworth is a 44 year old year-old male with history of neck pain. Surgical history of left inguinal hernia repair. The patient presents for colon cancer screening evaluation.   used during visit.     Patient is here today as a referral from his PCP for evaluation prior to undergoing colonoscopy for CRC screening. Patient denies any GI symptoms of nausea, vomiting, heartburn, dyspepsia, dysphagia, hematemesis, abdominal pain, change in bowel habits, constipation, diarrhea, hematochezia, or melena.  Additionally there is no unintentional weight loss.    Father had history of colon cancer, almost 80 years ago.     Last colonoscopy:  none  Last EGD: none     NSAIDS: none  Tobacco: none  Alcohol: none  Marijuana: daily   Illicit drugs: none    FH GI malignancy:  father history of colon cancer    No history of adverse reaction to sedation  No GEOVANNY  No anticoagulants/antiplatelet  No pacemaker/defibrillator  No pain medications and/or sleep aides    Wt Readings from Last 6 Encounters:   12/09/24 170 lb (77.1 kg)   01/15/24 175 lb (79.4 kg)   07/25/23 183 lb (83 kg)   04/03/23 178 lb (80.7 kg)   06/30/22 170 lb 6.4 oz (77.3 kg)          History, Medications, Allergies, ROS:      Past Medical History:    Deaf      Past Surgical History:   Procedure Laterality Date    Hernia surgery        Family Hx:   Family History   Problem Relation Age of Onset    Colon Cancer Father 81    Other (osteoarthritis) Mother     Other (digestive issues) Mother         unclear- had surgery      Social History:   Social History     Socioeconomic History    Marital status:    Tobacco Use    Smoking status:  Never    Smokeless tobacco: Never   Substance and Sexual Activity    Alcohol use: Never    Drug use: Never        Medications (Active prior to today's visit):  Current Outpatient Medications   Medication Sig Dispense Refill    polyethylene glycol, PEG 3350-KCl-NaBcb-NaCl-NaSulf, 236 g Oral Recon Soln Take 4,000 mL by mouth As Directed. Take 2,000 mL the night before your procedure and 2,000 mL the morning of your procedure. 1 each 0    pregabalin (LYRICA) 50 MG Oral Cap Take 1 capsule (50 mg total) by mouth 2 (two) times daily as needed (nerve pain). 60 capsule 1    ibuprofen 200 MG Oral Tab Take 1 tablet (200 mg total) by mouth every 6 (six) hours as needed for Pain.         Allergies:  Allergies[1]    ROS:   CONSTITUTIONAL: negative for fevers, chills, sweats  EYES Negative for scleral icterus or redness, and diplopia  HEENT: Negative for hoarseness  RESPIRATORY: Negative for cough and severe shortness of breath  CARDIOVASCULAR: Negative for crushing sub-sternal chest pain  GASTROINTESTINAL: See HPI  GENITOURINARY: Negative for dysuria  MUSCULOSKELETAL: Negative for arthralgias and myalgias  SKIN: Negative for jaundice, rash or pruritus  NEUROLOGICAL: Negative for dizziness and headaches  BEHAVIOR/PSYCH: Negative for psychotic behavior      PHYSICAL EXAM:   Blood pressure 101/67, pulse 82, height 5' 9.29\" (1.76 m).    GEN: Alert, no acute distress, well-nourished   ABDOMEN: Soft, symmetrical, non-tender without distention or guarding.  Aorta is without bruit or visible pulsation. Umbilicus is midline without herniation. Normoactive bowel sounds are present, No masses, hepatomegaly or splenomegaly noted.  MSK: No erythema, no warmth, no swelling of joints  SKIN: No jaundice, no erythema, no rashes, no lesions  HEMATOLOGIC: No bleeding, no bruising  NEURO: Alert and interactive, TRAVIS  PSYCH: appropriate mood & affect    Labs/Imaging:       ASSESSMENT/PLAN:   Jose Daniel Wadsworth is a 44 year old year-old male with history  of neck pain. Surgical history of left inguinal hernia repair. The patient presents for colon cancer screening evaluation.    #high risk CRC screening: patient has family history of colon cancer and is considered high risk for colorectal cancer. Patient is currently asymptomatic and denies diarrhea, hematochezia, thin-stools or weight loss. We discussed risks/benefits/alternatives to procedure, including stool testing, they want to proceed with colonoscopy.  No anemia noted on blood work.     Patient Instructions   1. Schedule colonoscopy with [facility] --patient requires    Diagnosis: colon cancer screening  Sedation: MAC  Prep: split dose golytely    2.  bowel prep from pharmacy   You can pick the bowel prep up now and store in a cool, dry place in your home until your scheduled bowel prep start date.    3. Continue all medications as normal for your procedure. DO NOT TAKE: Any form of alcohol, recreational drugs and any forms of erectile dysfunction medications 24 hours prior to procedure.    4. Read all bowel prep instructions carefully. Bowel prep instructions can also be found online at:  [email or web address]     5. AVOID seeds, nuts, popcorn, raw fruits and vegetables for 5 days before procedure    6. If you start any NEW medication after your visit today, please notify us. Certain medications (like iron or weight loss medications) will need to be held before the procedure, or the procedure cannot be performed safely.             Endoscopy risk/benefit discussion: I have thoroughly discussed the risks, benefits, and alternatives of endoscopic evaluation with the patient, who demonstrated understanding. This includes the potential risks of bleeding, infection, pain, anesthesia complications, and perforation, which may result in prolonged hospitalization or surgical intervention. All of the patient’s questions were addressed to their satisfaction. The patient has chosen  to proceed with the endoscopic procedure, including any necessary interventions such as polypectomy, biopsy, control of bleeding.      Orders This Visit:  No orders of the defined types were placed in this encounter.      Meds This Visit:  Requested Prescriptions     Signed Prescriptions Disp Refills    polyethylene glycol, PEG 3350-KCl-NaBcb-NaCl-NaSulf, 236 g Oral Recon Soln 1 each 0     Sig: Take 4,000 mL by mouth As Directed. Take 2,000 mL the night before your procedure and 2,000 mL the morning of your procedure.       Imaging & Referrals:  None       CARMINE Aguirre    [facility] Gastroenterology  2/11/2025               [1] No Known Allergies

## 2025-02-11 NOTE — TELEPHONE ENCOUNTER
Scheduled for:  Colonoscopy 29320  Provider Name:  Dr. Carbone   Date:  05/13/2025  Location:[facility]  Sedation:  MAC  Time:  (Patient is aware that endo/eosc will call with arrival time     Prep:  Trilyte Prep Instructions Given At The Office Visit.    Meds/Allergies Reconciled?:  Nadya Melchor, Reviewed   Diagnosis with codes:  Colon Screening Z12.11  Was patient informed to call insurance with codes (Y/N):  Yes  Referral sent?:  Referral was sent at the time of electronic surgical scheduling.  EM or [facility] notified?:  I sent an electronic request to Endo Scheduling and received a confirmation today.  Medication Orders:  Pt is aware to NOT take iron pills, herbal meds and diet supplements for 7 days before exam. Also to NOT take any form of alcohol, recreational drugs and any forms of ED meds 24 hours before exam.   Misc Orders:       Further instructions given by staff:  I provide prep instructions to patient at the time of the appointment and reviewed date, time and location, he verbalized that he understood and is aware to call if he has any questions.

## 2025-05-13 ENCOUNTER — HOSPITAL ENCOUNTER (OUTPATIENT)
Facility: HOSPITAL | Age: 45
Setting detail: HOSPITAL OUTPATIENT SURGERY
Discharge: HOME | End: 2025-05-13
Attending: INTERNAL MEDICINE | Admitting: INTERNAL MEDICINE
Payer: MEDICARE

## 2025-05-13 ENCOUNTER — ANESTHESIA EVENT (OUTPATIENT)
Dept: ENDOSCOPY | Facility: HOSPITAL | Age: 45
End: 2025-05-13
Payer: MEDICARE

## 2025-05-13 ENCOUNTER — ANESTHESIA (OUTPATIENT)
Dept: ENDOSCOPY | Facility: HOSPITAL | Age: 45
End: 2025-05-13
Payer: MEDICARE

## 2025-05-13 VITALS
RESPIRATION RATE: 10 BRPM | HEIGHT: 71 IN | TEMPERATURE: 98 F | BODY MASS INDEX: 23.8 KG/M2 | DIASTOLIC BLOOD PRESSURE: 88 MMHG | OXYGEN SATURATION: 97 % | WEIGHT: 170 LBS | HEART RATE: 65 BPM | SYSTOLIC BLOOD PRESSURE: 112 MMHG

## 2025-05-13 DIAGNOSIS — Z12.11 COLON CANCER SCREENING: ICD-10-CM

## 2025-05-13 PROBLEM — K63.5 POLYP OF COLON: Status: ACTIVE | Noted: 2025-05-13

## 2025-05-13 PROBLEM — Z80.0 FAMILY HISTORY OF COLON CANCER IN FATHER: Status: ACTIVE | Noted: 2025-05-13

## 2025-05-13 PROBLEM — K64.8 INTERNAL HEMORRHOIDS: Status: ACTIVE | Noted: 2025-05-13

## 2025-05-13 PROCEDURE — 45385 COLONOSCOPY W/LESION REMOVAL: CPT | Performed by: INTERNAL MEDICINE

## 2025-05-13 RX ORDER — LIDOCAINE HYDROCHLORIDE 10 MG/ML
INJECTION, SOLUTION EPIDURAL; INFILTRATION; INTRACAUDAL; PERINEURAL AS NEEDED
Status: DISCONTINUED | OUTPATIENT
Start: 2025-05-13 | End: 2025-05-13 | Stop reason: SURG

## 2025-05-13 RX ORDER — SODIUM CHLORIDE, SODIUM LACTATE, POTASSIUM CHLORIDE, CALCIUM CHLORIDE 600; 310; 30; 20 MG/100ML; MG/100ML; MG/100ML; MG/100ML
INJECTION, SOLUTION INTRAVENOUS CONTINUOUS
Status: DISCONTINUED | OUTPATIENT
Start: 2025-05-13 | End: 2025-05-13

## 2025-05-13 RX ORDER — NALOXONE HYDROCHLORIDE 0.4 MG/ML
0.08 INJECTION, SOLUTION INTRAMUSCULAR; INTRAVENOUS; SUBCUTANEOUS ONCE AS NEEDED
Status: DISCONTINUED | OUTPATIENT
Start: 2025-05-13 | End: 2025-05-13

## 2025-05-13 RX ADMIN — SODIUM CHLORIDE, SODIUM LACTATE, POTASSIUM CHLORIDE, CALCIUM CHLORIDE: 600; 310; 30; 20 INJECTION, SOLUTION INTRAVENOUS at 10:49:00

## 2025-05-13 RX ADMIN — SODIUM CHLORIDE, SODIUM LACTATE, POTASSIUM CHLORIDE, CALCIUM CHLORIDE: 600; 310; 30; 20 INJECTION, SOLUTION INTRAVENOUS at 10:25:00

## 2025-05-13 RX ADMIN — LIDOCAINE HYDROCHLORIDE 50 MG: 10 INJECTION, SOLUTION EPIDURAL; INFILTRATION; INTRACAUDAL; PERINEURAL at 10:29:00

## 2025-05-13 NOTE — H&P
History & Physical Examination    Patient Name: Jose Daniel Wadsworth  MRN: [id number]  CSN: [id number]  YOB: 1980    Diagnosis: family history CRC, CRC screening      Prescriptions Prior to Admission[1]  Current Hospital Medications[2]    Allergies: Allergies[3]    Past Medical History[4]  Past Surgical History[5]  Family History[6]  Social History     Tobacco Use    Smoking status: Never    Smokeless tobacco: Never   Substance Use Topics    Alcohol use: Yes     Comment: social       SYSTEM Check if Review is Normal Check if Physical Exam is Normal If not normal, please explain:   HEENT [X ] [ X]    NECK  [X ] [ X]    HEART [X ] [ X]    LUNGS [X ] [ X]    ABDOMEN [X ] [ X]    EXTREMITIES [X ] [ X]    OTHER        I have discussed the risks and benefits and alternatives of the procedure with the patient/family.  They understand and agree to proceed with plan of care.   I have reviewed the History and Physical done within the last 30 days.  Any changes noted above.    Yas Carbone MD                 [1]   Medications Prior to Admission   Medication Sig Dispense Refill Last Dose/Taking    polyethylene glycol, PEG 3350-KCl-NaBcb-NaCl-NaSulf, 236 g Oral Recon Soln Take 4,000 mL by mouth As Directed. Take 2,000 mL the night before your procedure and 2,000 mL the morning of your procedure. 1 each 0 Taking    pregabalin (LYRICA) 50 MG Oral Cap Take 1 capsule (50 mg total) by mouth 2 (two) times daily as needed (nerve pain). 60 capsule 1 Taking As Needed    ibuprofen 200 MG Oral Tab Take 1 tablet (200 mg total) by mouth every 6 (six) hours as needed for Pain.   Taking As Needed   [2]   No current facility-administered medications for this encounter.   [3] No Known Allergies  [4]   Past Medical History:   Deaf    Visual impairment    glasses prn   [5]   Past Surgical History:  Procedure Laterality Date    Hernia surgery     [6]   Family History  Problem Relation Age of Onset    Colon Cancer Father 81    Other  (osteoarthritis) Mother     Other (digestive issues) Mother         unclear- had surgery

## 2025-05-13 NOTE — ANESTHESIA POSTPROCEDURE EVALUATION
Patient: Jose Daniel Wadsworth    Procedure Summary       Date: 05/13/25 Room / Location: [facility] ENDOSCOPY 01 / [facility] ENDOSCOPY    Anesthesia Start: 1025 Anesthesia Stop:     Procedure: COLONOSCOPY Diagnosis:       Colon cancer screening      (Polyps, Hemorrhoids)    Surgeons: Yas Carbone MD Anesthesiologist: Jayne Ricks CRNA    Anesthesia Type: MAC ASA Status: 2            Anesthesia Type: MAC    Vitals Value Taken Time   /75 05/13/25 10:55   Temp 97.6 °F (36.4 °C) 05/13/25 10:55   Pulse 73 05/13/25 10:55   Resp 16 05/13/25 10:55   SpO2 96 % 05/13/25 10:55       [facility] AN Post Evaluation:   Patient Evaluated in PACU  Patient Participation: complete - patient participated  Level of Consciousness: sleepy but conscious  Pain Score: 0  Pain Management: adequate  Airway Patency:patent  Dental exam unchanged from preop  Yes    Cardiovascular Status: stable and acceptable  Respiratory Status: acceptable and room air  Postoperative Hydration acceptable      JAYNE RICKS CRNA  5/13/2025 10:56 AM

## 2025-05-13 NOTE — ANESTHESIA PREPROCEDURE EVALUATION
Anesthesia PreOp Note    HPI:     Jose Daniel Wadsworth is a 44 year old male who presents for preoperative consultation requested by: Yas Carbone MD    Date of Surgery: 5/13/2025    Procedure(s):  COLONOSCOPY  Indication: Colon cancer screening    Relevant Problems   No relevant active problems       NPO:  Last Liquid Consumption Date: 05/12/25  Last Liquid Consumption Time: 0700  Last Solid Consumption Date: 05/12/25  Last Solid Consumption Time: 0900  Last Liquid Consumption Date: 05/12/25          History Review:  There are no active problems to display for this patient.      Past Medical History[1]    Past Surgical History[2]    Prescriptions Prior to Admission[3]  Current Medications and Prescriptions Ordered in Epic[4]    Allergies[5]    Family History[6]  Social Hx on file[7]    Available pre-op labs reviewed.             Vital Signs:  Body mass index is 23.71 kg/m².   height is 1.803 m (5' 11\") and weight is 77.1 kg (170 lb). His blood pressure is 122/90 and his pulse is 66. His respiration is 10 and oxygen saturation is 96%.   Vitals:    05/09/25 0927 05/13/25 0959   BP:  122/90   Pulse:  66   Resp:  10   SpO2:  96%   Weight: 77.1 kg (170 lb)    Height: 1.803 m (5' 11\")         Anesthesia Evaluation     Patient summary reviewed and Nursing notes reviewed    Airway   Mallampati: II  TM distance: >3 FB  Neck ROM: full  Dental - Dentition appears grossly intact     Pulmonary - negative ROS and normal exam   Cardiovascular - negative ROS and normal exam    Neuro/Psych - negative ROS     GI/Hepatic/Renal - negative ROS     Endo/Other - negative ROS   Abdominal  - normal exam                 Anesthesia Plan:   ASA:  2  Plan:   MAC  Plan Comments: Pt is deaf- language line used for sign language interpretation  Informed Consent Plan and Risks Discussed With:  Patient  Consent Comment: Pt. Deaf- language line used to communicate  Discussed plan with:  Surgeon      I have informed Jose Daniel Wadsworth  and/or legal guardian or family member of the nature of the anesthetic plan, benefits, risks including possible dental damage if relevant, major complications, and any alternative forms of anesthetic management.   All of the patient's questions were answered to the best of my ability. The patient desires the anesthetic management as planned.  TAYLOR RICKSJULIANE  5/13/2025 10:09 AM  Present on Admission:  **None**           [1]   Past Medical History:   Deaf    Visual impairment    glasses prn   [2]   Past Surgical History:  Procedure Laterality Date    Hernia surgery     [3]   Medications Prior to Admission   Medication Sig Dispense Refill Last Dose/Taking    polyethylene glycol, PEG 3350-KCl-NaBcb-NaCl-NaSulf, 236 g Oral Recon Soln Take 4,000 mL by mouth As Directed. Take 2,000 mL the night before your procedure and 2,000 mL the morning of your procedure. 1 each 0 Taking    pregabalin (LYRICA) 50 MG Oral Cap Take 1 capsule (50 mg total) by mouth 2 (two) times daily as needed (nerve pain). 60 capsule 1 5/6/2025    ibuprofen 200 MG Oral Tab Take 1 tablet (200 mg total) by mouth every 6 (six) hours as needed for Pain.   5/6/2025   [4]   Current Facility-Administered Medications Ordered in Epic   Medication Dose Route Frequency Provider Last Rate Last Admin    lactated ringers infusion   Intravenous Continuous Yas Carbone MD         No current [facility]-ordered outpatient medications on file.   [5] No Known Allergies  [6]   Family History  Problem Relation Age of Onset    Colon Cancer Father 81    Other (osteoarthritis) Mother     Other (digestive issues) Mother         unclear- had surgery   [7]   Social History  Socioeconomic History    Marital status:    Tobacco Use    Smoking status: Never    Smokeless tobacco: Never   Vaping Use    Vaping status: Every Day   Substance and Sexual Activity    Alcohol use: Yes     Comment: social    Drug use: Yes     Types: Cannabis     Comment: daily

## 2025-05-13 NOTE — DISCHARGE INSTRUCTIONS
Home Care Instructions for Colonoscopy with Sedation    Diet:  - Resume your regular diet as tolerated unless otherwise instructed.  - Start with light meals to minimize bloating.  - Do not drink alcohol today.    Medication:  - If you have questions about resuming your normal medications, please contact your Primary Care Physician.    Activities:  - Take it easy today. Do not return to work today.  - Do not drive today.  - Do not operate any machinery today (including kitchen equipment).  -   Do not make any critical decisions or sign any paperwork.  - Do not exercise today.    Colonoscopy:  - You may notice some rectal \"spotting\" (a little blood on the toilet tissue) for a day or two after the exam. This is normal.  - If you experience any rectal bleeding (not spotting), persistent tenderness or sharp severe abdominal pains, oral temperature over 100 degrees Fahrenheit, light-headedness or dizziness, or any other problems, contact your doctor.      **If unable to reach your doctor, please go to the [facility] Emergency Room**    - Your referring physician will receive a full report of your examination.  - If you do not hear from your doctor's office within two weeks of your biopsy, please call them for your results.    You may be able to see your laboratory results in [employer] between 4 and 7 business days.  In some cases, your physician may not have viewed the results before they are released to [employer].  If you have questions regarding your results contact the physician who ordered the test/exam by phone or via [employer] by choosing \"Ask a Medical Question.\"

## 2025-05-13 NOTE — OPERATIVE REPORT
COLONOSCOPY REPORT    Jose Daniel Wadsworth     1980 Age 44 year old   PCP Hannah Cummins MD Endoscopist Yas Carbone MD       Date of procedure: 25    Procedure: Colonoscopy w/ cold snare polypectomy     Pre-operative diagnosis: fam hx CRC, CRC screening     Post-operative diagnosis: colon polyps, hemorrhoids     Medications: MAC    Withdrawal time: 18 minutes    Procedure:  Informed consent was obtained from the patient after the risks of the procedure were discussed, including but not limited to bleeding, perforation, aspiration, infection, or possibility of a missed lesion. After discussions of the risks/benefits and alternatives to this procedure, as well as the planned sedation, the patient was placed in the left lateral decubitus position and begun on continuous blood pressure pulse oximetry and EKG monitoring and this was maintained throughout the procedure. Once an adequate level of sedation was obtained a digital rectal exam was completed. Then the lubricated tip of the [id number] diagnostic video colonoscope was inserted and advanced without difficulty to the cecum using water immersion and CO2 insufflation technique. The cecum was identified by localizing the trifold, the appendix and the ileocecal valve. Withdrawal was begun with thorough washing and careful examination of the colonic walls and folds. A routine second examination of the cecum/ascending colon was performed. Photodocumentation was obtained. The bowel prep was excellent. Views of the colon were excellent with washing. I then carefully withdrew the instrument from the patient who tolerated the procedure well.     Complications: none.    Findings:   1. Six polyps noted as follows:      A. 5 mm polyp in the ascending colon; sessile morphology; cold snare polypectomy performed, polyp retrieved.      B. THREE -- 4-6 mm polyps in the transverse colon; sessile morphology; cold snare polypectomy performed, polyps  retrieved.      C. TWO -- 3-4 mm polyps in the sigmoid colon; sessile morphology; cold snare polypectomy performed, polyps retrieved.    2. Diverticulosis: none.    3. Ileocecal valve appeared healthy and normal. The examined portion of the terminal ileum appeared normal.    4. The colonic mucosa throughout the colon showed normal vascular pattern, without evidence of angioectasias or inflammation.     5. A retroflexed view of the rectum revealed small internal hemorrhoids.    6. ISAAC: normal rectal tone, no masses palpated.     Impression:   Six sub-centimeter polyps removed.   Small internal hemorrhoids.   Normal terminal ileum.   The colon was otherwise normal with glistening mucosa and intact vascular pattern.    Recommend:  Await pathology. The interval for the next colonoscopy will be determined after reviewing pathology. If new signs or symptoms develop, colonoscopy may need to be repeated sooner.   High fiber diet.  Monitor for blood in the stool. If having more than just tinge of blood, call office or go to the ER.  Avoid NSAIDs (motrin, ibuprofen, aleve, advil, naproxen, midol, naprosyn, excedrin) for 14 days.     >>>If tissue was obtained and you have not received your pathology results either by phone or letter within 2 weeks, please call our office at [phone].    Specimens: colon polyps     Blood loss: <1 ml

## 2025-05-14 ENCOUNTER — TELEPHONE (OUTPATIENT)
Facility: CLINIC | Age: 45
End: 2025-05-14

## 2025-05-14 NOTE — TELEPHONE ENCOUNTER
Patient has viewed [employer]. Left message to call back using relay  #[phone].      Dear Jose Daniel,      I reviewed the pathology report from the polyps completely removed during your recent colonoscopy. One or more polyps were an adenoma, which are benign growths. However, these are the types of polyps that if not removed could become a colon cancer. All of your polyps were completely removed. The current health care guidelines recommend that patients with the size/number of your adenomas repeat their colonoscopy in 3 years to look for any new polyps that might develop.      The other polyps removed from your colon were hyperplastic polyps. Hyperplastic polyps are not the type of polyps associated with a risk for becoming colon cancer.      If you have further questions please call me at [phone] or message me through [employer].     Have a great rest of your week.     Yas Carbone MD      Written by Yas Carbone MD on 5/14/2025 12:21 PM CDT  Seen by patient Jose Daniel Wadsworth on 5/14/2025 12:38 PM

## 2025-05-14 NOTE — TELEPHONE ENCOUNTER
Hearing impaired patient calling with , she is requesting results from 5/13/25 Colonoscopy.  Please call home # to get relay .       Relay : [phone]

## 2025-05-14 NOTE — PROGRESS NOTES
GI Staff:    Can you please place recall for this patient to have a colonoscopy in 3 years.    Thank you,  Yas

## 2025-05-15 ENCOUNTER — TELEPHONE (OUTPATIENT)
Facility: CLINIC | Age: 45
End: 2025-05-15

## 2025-05-15 NOTE — TELEPHONE ENCOUNTER
----- Message from Yas Carbone sent at 5/14/2025 12:21 PM CDT -----  GI Staff:    Can you please place recall for this patient to have a colonoscopy in 3 years.    Thank you,  Yas      ----- Message -----  From: Lab, Background User  Sent: 5/13/2025   5:49 PM CDT  To: Yas Carbone MD

## 2025-05-15 NOTE — TELEPHONE ENCOUNTER
Recall colonoscopy in 3 years per Dr. Carbone.     Last done 5/13/2025.     Recall entered into patient outreach for 5/13/2028.     Health maintenance updated.

## 2025-06-18 ENCOUNTER — TELEPHONE (OUTPATIENT)
Age: 45
End: 2025-06-18

## (undated) DEVICE — V2 SPECIMEN COLLECTION MANIFOLD KIT: Brand: NEPTUNE

## (undated) DEVICE — LASSO POLYPECTOMY SNARE: Brand: LASSO

## (undated) DEVICE — KIT ENDO ORCAPOD 160/180/190

## (undated) DEVICE — KIT CLEAN ENDOKIT 1.1OZ GOWNX2

## (undated) DEVICE — Device

## (undated) DEVICE — 60 ML SYRINGE REGULAR TIP: Brand: MONOJECT

## (undated) NOTE — LETTER
[facility]  [address], [place], [place]    Authorization for Surgical Operation and Procedure                               I hereby authorize Yas Carbone MD, my physician and his/her assistants (if applicable), which may include medical students, residents, and/or fellows, to perform the following surgical operation/ procedure and administer such anesthesia as may be determined necessary by my physician: Operation/Procedure name (s) COLONOSCOPY on Jose Daniel Wadsworth   2.   I recognize that during the surgical operation/procedure, unforeseen conditions may necessitate additional or different procedures than those listed above.  I, therefore, further authorize and request that the above-named surgeon, assistants, or designees perform such procedures as are, in their judgment, necessary and desirable.    3.   My surgeon/physician has discussed prior to my surgery the potential benefits, risks and side effects of this procedure; the likelihood of achieving goals; and potential problems that might occur during recuperation.  They also discussed reasonable alternatives to the procedure, including risks, benefits, and side effects related to the alternatives and risks related to not receiving this procedure.  I have had all my questions answered and I acknowledge that no guarantee has been made as to the result that may be obtained.    4.   Should the need arise during my operation/procedure, which includes change of level of care prior to discharge, I also consent to the administration of blood and/or blood products.  Further, I understand that despite careful testing and screening of blood or blood products by collecting agencies, I may still be subject to ill effects as a result of receiving a blood transfusion and/or blood products.  The following are some, but not all, of the potential risks that can occur: fever and allergic reactions, hemolytic reactions, transmission of  diseases such as Hepatitis, AIDS and Cytomegalovirus (CMV) and fluid overload.  In the event that I wish to have an autologous transfusion of my own blood, or a directed donor transfusion, I will discuss this with my physician.  Check only if Refusing Blood or Blood Products  I understand refusal of blood or blood products as deemed necessary by my physician may have serious consequences to my condition to include possible death. I hereby assume responsibility for my refusal and release the hospital, its personnel, and my physicians from any responsibility for the consequences of my refusal.    o  Refuse   5.   I authorize the use of any specimen, organs, tissues, body parts or foreign objects that may be removed from my body during the operation/procedure for diagnosis, research or teaching purposes and their subsequent disposal by hospital authorities.  I also authorize the release of specimen test results and/or written reports to my treating physician on the hospital medical staff or other referring or consulting physicians involved in my care, at the discretion of the Pathologist or my treating physician.    6.   I consent to the photographing or videotaping of the operations or procedures to be performed, including appropriate portions of my body for medical, scientific, or educational purposes, provided my identity is not revealed by the pictures or by descriptive texts accompanying them.  If the procedure has been photographed/videotaped, the surgeon will obtain the original picture, image, videotape or CD.  The hospital will not be responsible for storage, release or maintenance of the picture, image, tape or CD.    7.   I consent to the presence of a  or observers in the operating room as deemed necessary by my physician or their designees.    8.   I recognize that in the event my procedure results in extended X-Ray/fluoroscopy time, I may develop a skin reaction.    9. If I have a Do Not  Attempt Resuscitation (DNAR) order in place, that status will be suspended while in the operating room, procedural suite, and during the recovery period unless otherwise explicitly stated by me (or a person authorized to consent on my behalf). The surgeon or my attending physician will determine when the applicable recovery period ends for purposes of reinstating the DNAR order.  10. Patients having a sterilization procedure: I understand that if the procedure is successful the results will be permanent and it will therefore be impossible for me to inseminate, conceive, or bear children.  I also understand that the procedure is intended to result in sterility, although the result has not been guaranteed.   11. I acknowledge that my physician has explained sedation/analgesia administration to me including the risk and benefits I consent to the administration of sedation/analgesia as may be necessary or desirable in the judgment of my physician.    I CERTIFY THAT I HAVE READ AND FULLY UNDERSTAND THE ABOVE CONSENT TO OPERATION and/or OTHER PROCEDURE.     ____________________________________  _________________________________        ______________________________  Signature of Patient    Signature of Responsible Person                Printed Name of Responsible Person                                      ____________________________________  _____________________________                ________________________________  Signature of Witness        Date  Time         Relationship to Patient    STATEMENT OF PHYSICIAN My signature below affirms that prior to the time of the procedure; I have explained to the patient and/or his/her legal representative, the risks and benefits involved in the proposed treatment and any reasonable alternative to the proposed treatment. I have also explained the risks and benefits involved in refusal of the proposed treatment and alternatives to the proposed treatment and have answered the  patient's questions. If I have a significant financial interest in a co-management agreement or a significant financial interest in any product or implant, or other significant relationship used in this procedure/surgery, I have disclosed this and had a discussion with my patient.     _____________________________________________________              _____________________________  (Signature of Physician)                                                                                         (Date)                                   (Time)  Patient Name: Jose Daniel Wadsworth      : 1980      Printed: 2025     Medical Record #: [id number]                                      Page 1 of 1

## (undated) NOTE — LETTER
8/1/2022  Jose Daniel Wadsworth  [address] [facility] [address]    We take each of our patient's health very seriously and the key to maintaining your health is an annual wellness physical.  Review of your medical records shows that it is time for your x-ray of the lumbar and cervical spine. Please contact central scheduling at your earliest convenience to schedule this appointment at [phone].   Thank Reji Palm MD

## (undated) NOTE — LETTER
AUTHORIZATION FOR SURGICAL OPERATION OR OTHER PROCEDURE    1. I hereby authorize Dr. Siri Wiggins, and [place] [facility] staff assigned to my case to perform the following operation and/or procedure at the [facility], [facility]:    _______________________________________________________________________________________________    Cortisone Injection to Left Shoulder  _______________________________________________________________________________________________    2. My physician has explained the nature and purpose of the operation or other procedure, possible alternative methods of treatment, the risks involved, and the possibility of complication to me. I acknowledge that no guarantee has been made as to the result that may be obtained. 3.  I recognize that, during the course of this operation, or other procedure, unforseen conditions may necessitate additional or different procedure than those listed above. I, therefore, further authorize and request that the above named physician, his/her physician assistants or designees perform such procedures as are, in his/her professional opinion, necessary and desirable. 4.  Any tissue or organs removed in the operation or other procedure may be disposed of by and at the discretion of the [facility] and [facility]. 5.  I understand that in the event of a medical emergency, I will be transported by local paramedics to [facility] or other hospital emergency department. 6.  I certify that I have read and fully understand the above consent to operation and/or other procedure. 7.  I acknowledge that my physician has explained sedation/analgesia administration to me including the risks and benefits. I consent to the administration of sedation/analgesia as may be necessary or desirable in the judgement of my physician.     Witness signature: ___________________________________________________ Date:  ______/______/_____ Time:  ________ A. M.  P.M. Patient Name:  ______________________________________________________  (please print)      Patient signature:  ___________________________________________________             Relationship to Patient:           []  Parent    Responsible person                          []  Spouse  In case of minor or                    [] Other  _____________   Incompetent name:  __________________________________________________                               (please print)      _____________      Responsible person  In case of minor or  Incompetent signature:  _______________________________________________    Statement of Physician  My signature below affirms that prior to the time of the procedure, I have explained to the patient and/or his/her guardian, the risks and benefits involved in the proposed treatment and any reasonable alternative to the proposed treatment. I have also explained the risks and benefits involved in the refusal of the proposed treatment and have answered the patient's questions.                         Date:  ______/______/_______  Provider                      Signature:  __________________________________________________________       Time:  ___________ A.M    P.M.

## (undated) NOTE — LETTER
[facility]  Administration of Anesthesia  IJose Daniel agree to be cared for by a physician anesthesiologist alone and/or with a nurse anesthetist, who is specially trained to monitor me and give me medicine to put me to sleep or keep me comfortable during my procedure    I understand that my anesthesiologist and/or anesthetist is not an employee or agent of [facility] or [employer]. He or she works for [facility]    As the patient asking for anesthesia services, I agree to:  Allow the anesthesiologist (anesthesia doctor) to give me medicine and do additional procedures as necessary. Some examples are: Starting or using an “IV” to give me medicine, fluids or blood during my procedure, and having a breathing tube placed to help me breathe when I’m asleep (intubation). In the event that my heart stops working properly, I understand that my anesthesiologist will make every effort to sustain my life, unless otherwise directed by [facility] Do Not Resuscitate documents.  Tell my anesthesia doctor before my procedure:  If I am pregnant.  The last time that I ate or drank.  iii. All of the medicines I take (including prescriptions, herbal supplements, and pills I can buy without a prescription (including street drugs/illegal medications). Failure to inform my anesthesiologist about these medicines may increase my risk of anesthetic complications.  iv.If I am allergic to anything or have had a reaction to anesthesia before.  I understand how the anesthesia medicine will help me (benefits).  I understand that with any type of anesthesia medicine there are risks:  The most common risks are: nausea, vomiting, sore throat, muscle soreness, damage to my eyes, mouth, or teeth (from breathing tube placement).  Rare risks include: remembering what happened during my procedure, allergic reactions to medications, injury to my airway, heart, lungs, vision, nerves, or  muscles and in extremely rare instances death.  My doctor has explained to me other choices available to me for my care (alternatives).  Pregnant Patients (“epidural”):  I understand that the risks of having an epidural (medicine given into my back to help control pain during labor), include itching, low blood pressure, difficulty urinating, headache or slowing of the baby’s heart. Very rare risks include infection, bleeding, seizure, irregular heart rhythms and nerve injury.  Regional Anesthesia (“spinal”, “epidural”, & “nerve blocks”):  I understand that rare but potential complications include headache, bleeding, infection, seizure, irregular heart rhythms, and nerve injury.    _____________________________________________________________________________  Patient (or Representative) Signature/Relationship to Patient  Date   Time    _____________________________________________________________________________   Name (if used)    Language/Organization   Time    _____________________________________________________________________________  Nurse Anesthetist Signature     Date   Time  _____________________________________________________________________________  Anesthesiologist Signature     Date   Time  I have discussed the procedure and information above with the patient (or patient’s representative) and answered their questions. The patient or their representative has agreed to have anesthesia services.    _____________________________________________________________________________  Witness        Date   Time  I have verified that the signature is that of the patient or patient’s representative, and that it was signed before the procedure  Patient Name: Jose Daniel Wadsworth     : 1980                 Printed: 2025 at 3:15 PM    Medical Record #: [id number]                                            Page 1 of 1  ----------ANESTHESIA CONSENT----------